# Patient Record
Sex: MALE | Race: WHITE | Employment: OTHER | URBAN - METROPOLITAN AREA
[De-identification: names, ages, dates, MRNs, and addresses within clinical notes are randomized per-mention and may not be internally consistent; named-entity substitution may affect disease eponyms.]

---

## 2019-09-11 ENCOUNTER — HOSPITAL ENCOUNTER (OUTPATIENT)
Dept: GENERAL RADIOLOGY | Age: 69
Discharge: HOME | End: 2019-09-11
Payer: MEDICARE

## 2019-09-11 DIAGNOSIS — R04.2 HEMOPTYSIS: ICD-10-CM

## 2019-09-11 PROCEDURE — 71046 X-RAY EXAM CHEST 2 VIEWS: CPT

## 2019-09-16 ENCOUNTER — HOSPITAL ENCOUNTER (OUTPATIENT)
Dept: NUCLEAR MEDICINE | Age: 69
Discharge: HOME | End: 2019-09-16
Attending: INTERNAL MEDICINE
Payer: MEDICARE

## 2019-09-16 DIAGNOSIS — R04.2 HEMOPTYSIS: ICD-10-CM

## 2019-09-16 PROCEDURE — 78582 LUNG VENTILAT&PERFUS IMAGING: CPT

## 2020-08-24 ENCOUNTER — HOSPITAL ENCOUNTER (OUTPATIENT)
Age: 70
Setting detail: OBSERVATION
Discharge: HOME | End: 2020-08-25
Attending: INTERNAL MEDICINE | Admitting: INTERNAL MEDICINE
Payer: MEDICARE

## 2020-08-24 ENCOUNTER — APPOINTMENT (OUTPATIENT)
Dept: CT IMAGING | Age: 70
End: 2020-08-24
Attending: INTERNAL MEDICINE
Payer: MEDICARE

## 2020-08-24 ENCOUNTER — HOSPITAL ENCOUNTER (OUTPATIENT)
Dept: CT IMAGING | Age: 70
Discharge: HOME | End: 2020-08-24
Attending: INTERNAL MEDICINE
Payer: MEDICARE

## 2020-08-24 DIAGNOSIS — N18.4 CHRONIC KIDNEY DISEASE, STAGE IV (SEVERE) (HCC): ICD-10-CM

## 2020-08-24 PROBLEM — R80.9 PROTEINURIA: Status: ACTIVE | Noted: 2020-08-24

## 2020-08-24 LAB
ABO + RH BLD: NORMAL
BLOOD GROUP ANTIBODIES SERPL: NORMAL
ERYTHROCYTE [DISTWIDTH] IN BLOOD BY AUTOMATED COUNT: 12 % (ref 11.5–14.5)
GLUCOSE BLD STRIP.AUTO-MCNC: 86 MG/DL (ref 65–100)
HCT VFR BLD AUTO: 42.2 % (ref 36.6–50.3)
HCT VFR BLD AUTO: 42.2 % (ref 36.6–50.3)
HGB BLD-MCNC: 13.5 G/DL (ref 12.1–17)
HGB BLD-MCNC: 13.6 G/DL (ref 12.1–17)
INR PPP: 1.1 (ref 0.9–1.1)
MCH RBC QN AUTO: 30.4 PG (ref 26–34)
MCHC RBC AUTO-ENTMCNC: 32.2 G/DL (ref 30–36.5)
MCV RBC AUTO: 94.4 FL (ref 80–99)
NRBC # BLD: 0 K/UL (ref 0–0.01)
NRBC BLD-RTO: 0 PER 100 WBC
PLATELET # BLD AUTO: 216 K/UL (ref 150–400)
PMV BLD AUTO: 10.3 FL (ref 8.9–12.9)
PROTHROMBIN TIME: 11.4 SEC (ref 9–11.1)
RBC # BLD AUTO: 4.47 M/UL (ref 4.1–5.7)
SERVICE CMNT-IMP: NORMAL
SPECIMEN EXP DATE BLD: NORMAL
WBC # BLD AUTO: 7.6 K/UL (ref 4.1–11.1)

## 2020-08-24 PROCEDURE — 50200 RENAL BIOPSY PERQ: CPT

## 2020-08-24 PROCEDURE — 86900 BLOOD TYPING SEROLOGIC ABO: CPT

## 2020-08-24 PROCEDURE — 36415 COLL VENOUS BLD VENIPUNCTURE: CPT

## 2020-08-24 PROCEDURE — 85610 PROTHROMBIN TIME: CPT

## 2020-08-24 PROCEDURE — 77030014115

## 2020-08-24 PROCEDURE — 99218 HC RM OBSERVATION: CPT

## 2020-08-24 PROCEDURE — 77030003503 HC NDL BIOP TISS BD -B

## 2020-08-24 PROCEDURE — 77030014007 HC SPNG HEMSTAT J&J -B

## 2020-08-24 PROCEDURE — 85018 HEMOGLOBIN: CPT

## 2020-08-24 PROCEDURE — 82962 GLUCOSE BLOOD TEST: CPT

## 2020-08-24 PROCEDURE — 74011250637 HC RX REV CODE- 250/637: Performed by: INTERNAL MEDICINE

## 2020-08-24 PROCEDURE — 74011000250 HC RX REV CODE- 250: Performed by: RADIOLOGY

## 2020-08-24 PROCEDURE — 85027 COMPLETE CBC AUTOMATED: CPT

## 2020-08-24 PROCEDURE — 77030003666 HC NDL SPINAL BD -A

## 2020-08-24 PROCEDURE — 74011250636 HC RX REV CODE- 250/636: Performed by: RADIOLOGY

## 2020-08-24 RX ORDER — MIDAZOLAM HYDROCHLORIDE 1 MG/ML
5 INJECTION, SOLUTION INTRAMUSCULAR; INTRAVENOUS
Status: DISCONTINUED | OUTPATIENT
Start: 2020-08-24 | End: 2020-08-24 | Stop reason: HOSPADM

## 2020-08-24 RX ORDER — DEXTROSE MONOHYDRATE 100 MG/ML
0-250 INJECTION, SOLUTION INTRAVENOUS AS NEEDED
Status: DISCONTINUED | OUTPATIENT
Start: 2020-08-24 | End: 2020-08-25 | Stop reason: HOSPADM

## 2020-08-24 RX ORDER — CLONIDINE HYDROCHLORIDE 0.1 MG/1
0.1 TABLET ORAL AS NEEDED
Status: DISCONTINUED | OUTPATIENT
Start: 2020-08-24 | End: 2020-08-24 | Stop reason: SDUPTHER

## 2020-08-24 RX ORDER — GLIPIZIDE 5 MG/1
5 TABLET ORAL
Status: DISCONTINUED | OUTPATIENT
Start: 2020-08-25 | End: 2020-08-25 | Stop reason: HOSPADM

## 2020-08-24 RX ORDER — SODIUM BICARBONATE 42 MG/ML
1 INJECTION, SOLUTION INTRAVENOUS
Status: COMPLETED | OUTPATIENT
Start: 2020-08-24 | End: 2020-08-24

## 2020-08-24 RX ORDER — PROPRANOLOL HYDROCHLORIDE 10 MG/1
10 TABLET ORAL
Status: DISCONTINUED | OUTPATIENT
Start: 2020-08-24 | End: 2020-08-24

## 2020-08-24 RX ORDER — MAGNESIUM SULFATE 100 %
4 CRYSTALS MISCELLANEOUS AS NEEDED
Status: DISCONTINUED | OUTPATIENT
Start: 2020-08-24 | End: 2020-08-25 | Stop reason: HOSPADM

## 2020-08-24 RX ORDER — AMLODIPINE BESYLATE 5 MG/1
5 TABLET ORAL DAILY
Status: DISCONTINUED | OUTPATIENT
Start: 2020-08-25 | End: 2020-08-25 | Stop reason: HOSPADM

## 2020-08-24 RX ORDER — SODIUM CHLORIDE 9 MG/ML
25 INJECTION, SOLUTION INTRAVENOUS CONTINUOUS
Status: DISCONTINUED | OUTPATIENT
Start: 2020-08-24 | End: 2020-08-24 | Stop reason: HOSPADM

## 2020-08-24 RX ORDER — NALOXONE HYDROCHLORIDE 0.4 MG/ML
0.4 INJECTION, SOLUTION INTRAMUSCULAR; INTRAVENOUS; SUBCUTANEOUS AS NEEDED
Status: DISCONTINUED | OUTPATIENT
Start: 2020-08-24 | End: 2020-08-24 | Stop reason: HOSPADM

## 2020-08-24 RX ORDER — FENTANYL CITRATE 50 UG/ML
200 INJECTION, SOLUTION INTRAMUSCULAR; INTRAVENOUS
Status: DISCONTINUED | OUTPATIENT
Start: 2020-08-24 | End: 2020-08-24 | Stop reason: HOSPADM

## 2020-08-24 RX ORDER — SODIUM CHLORIDE 0.9 % (FLUSH) 0.9 %
5-40 SYRINGE (ML) INJECTION AS NEEDED
Status: DISCONTINUED | OUTPATIENT
Start: 2020-08-24 | End: 2020-08-25 | Stop reason: HOSPADM

## 2020-08-24 RX ORDER — ACETAMINOPHEN 325 MG/1
650 TABLET ORAL
Status: DISCONTINUED | OUTPATIENT
Start: 2020-08-24 | End: 2020-08-25 | Stop reason: HOSPADM

## 2020-08-24 RX ORDER — ROSUVASTATIN CALCIUM 10 MG/1
5 TABLET, COATED ORAL
Status: DISCONTINUED | OUTPATIENT
Start: 2020-08-24 | End: 2020-08-25 | Stop reason: HOSPADM

## 2020-08-24 RX ORDER — SODIUM CHLORIDE 9 MG/ML
25 INJECTION, SOLUTION INTRAVENOUS CONTINUOUS
Status: DISCONTINUED | OUTPATIENT
Start: 2020-08-24 | End: 2020-08-28 | Stop reason: HOSPADM

## 2020-08-24 RX ORDER — SODIUM CHLORIDE 0.9 % (FLUSH) 0.9 %
5-40 SYRINGE (ML) INJECTION EVERY 8 HOURS
Status: DISCONTINUED | OUTPATIENT
Start: 2020-08-24 | End: 2020-08-28 | Stop reason: HOSPADM

## 2020-08-24 RX ORDER — LOSARTAN POTASSIUM 50 MG/1
50 TABLET ORAL DAILY
Status: DISCONTINUED | OUTPATIENT
Start: 2020-08-25 | End: 2020-08-25 | Stop reason: HOSPADM

## 2020-08-24 RX ORDER — SODIUM CHLORIDE 9 MG/ML
25 INJECTION, SOLUTION INTRAVENOUS AS NEEDED
Status: DISCONTINUED | OUTPATIENT
Start: 2020-08-24 | End: 2020-08-25 | Stop reason: HOSPADM

## 2020-08-24 RX ORDER — CLONIDINE HYDROCHLORIDE 0.1 MG/1
0.1 TABLET ORAL AS NEEDED
Status: DISCONTINUED | OUTPATIENT
Start: 2020-08-24 | End: 2020-08-25 | Stop reason: HOSPADM

## 2020-08-24 RX ORDER — SODIUM CHLORIDE 0.9 % (FLUSH) 0.9 %
5-40 SYRINGE (ML) INJECTION EVERY 8 HOURS
Status: DISCONTINUED | OUTPATIENT
Start: 2020-08-24 | End: 2020-08-25 | Stop reason: HOSPADM

## 2020-08-24 RX ORDER — FLUMAZENIL 0.1 MG/ML
0.5 INJECTION INTRAVENOUS ONCE
Status: DISCONTINUED | OUTPATIENT
Start: 2020-08-24 | End: 2020-08-24 | Stop reason: HOSPADM

## 2020-08-24 RX ORDER — SODIUM CHLORIDE 0.9 % (FLUSH) 0.9 %
5-40 SYRINGE (ML) INJECTION AS NEEDED
Status: DISCONTINUED | OUTPATIENT
Start: 2020-08-24 | End: 2020-08-28 | Stop reason: HOSPADM

## 2020-08-24 RX ORDER — INSULIN LISPRO 100 [IU]/ML
INJECTION, SOLUTION INTRAVENOUS; SUBCUTANEOUS
Status: DISCONTINUED | OUTPATIENT
Start: 2020-08-24 | End: 2020-08-25 | Stop reason: HOSPADM

## 2020-08-24 RX ADMIN — FENTANYL CITRATE 25 MCG: 50 INJECTION INTRAMUSCULAR; INTRAVENOUS at 15:26

## 2020-08-24 RX ADMIN — SODIUM CHLORIDE 25 ML/HR: 900 INJECTION, SOLUTION INTRAVENOUS at 14:30

## 2020-08-24 RX ADMIN — ROSUVASTATIN 5 MG: 10 TABLET, FILM COATED ORAL at 21:31

## 2020-08-24 RX ADMIN — FENTANYL CITRATE 50 MCG: 50 INJECTION INTRAMUSCULAR; INTRAVENOUS at 15:13

## 2020-08-24 RX ADMIN — MIDAZOLAM HYDROCHLORIDE 1 MG: 1 INJECTION, SOLUTION INTRAMUSCULAR; INTRAVENOUS at 14:49

## 2020-08-24 RX ADMIN — Medication 10 ML: at 21:31

## 2020-08-24 RX ADMIN — MIDAZOLAM HYDROCHLORIDE 0.5 MG: 1 INJECTION, SOLUTION INTRAMUSCULAR; INTRAVENOUS at 15:35

## 2020-08-24 RX ADMIN — SODIUM BICARBONATE 1 ML: 42 INJECTION, SOLUTION INTRAVENOUS at 15:28

## 2020-08-24 RX ADMIN — MIDAZOLAM HYDROCHLORIDE 0.5 MG: 1 INJECTION, SOLUTION INTRAMUSCULAR; INTRAVENOUS at 15:38

## 2020-08-24 RX ADMIN — MIDAZOLAM HYDROCHLORIDE 1 MG: 1 INJECTION, SOLUTION INTRAMUSCULAR; INTRAVENOUS at 15:13

## 2020-08-24 NOTE — H&P
Interventional Radiology History and Physical (Inpatient)    Patient: Damaso Quiroga 81 y.o. male     Referring Physician:  Laura Grey MD    Chief Complaint: No chief complaint on file. History of Present Illness: conscious sedation (Versed and fentanyl) for reanl biopsy    History:  No past medical history on file. No family history on file.   Social History     Socioeconomic History    Marital status:      Spouse name: Not on file    Number of children: Not on file    Years of education: Not on file    Highest education level: Not on file   Occupational History    Not on file   Social Needs    Financial resource strain: Not on file    Food insecurity     Worry: Not on file     Inability: Not on file    Transportation needs     Medical: Not on file     Non-medical: Not on file   Tobacco Use    Smoking status: Not on file   Substance and Sexual Activity    Alcohol use: Not on file    Drug use: Not on file    Sexual activity: Not on file   Lifestyle    Physical activity     Days per week: Not on file     Minutes per session: Not on file    Stress: Not on file   Relationships    Social connections     Talks on phone: Not on file     Gets together: Not on file     Attends Hinduism service: Not on file     Active member of club or organization: Not on file     Attends meetings of clubs or organizations: Not on file     Relationship status: Not on file    Intimate partner violence     Fear of current or ex partner: Not on file     Emotionally abused: Not on file     Physically abused: Not on file     Forced sexual activity: Not on file   Other Topics Concern    Not on file   Social History Narrative    Not on file       Allergies: No Known Allergies    Current Medications:  Current Facility-Administered Medications   Medication Dose Route Frequency    sodium chloride (NS) flush 5-40 mL  5-40 mL IntraVENous Q8H    sodium chloride (NS) flush 5-40 mL  5-40 mL IntraVENous PRN    0.9% sodium chloride infusion  25 mL/hr IntraVENous CONTINUOUS    sodium bicarbonate (4.2%) injection 42 mg  1 mL SubCUTAneous RAD ONCE     No current outpatient medications on file. Facility-Administered Medications Ordered in Other Encounters   Medication Dose Route Frequency    sodium chloride (NS) flush 5-40 mL  5-40 mL IntraVENous Q8H    sodium chloride (NS) flush 5-40 mL  5-40 mL IntraVENous PRN    0.9% sodium chloride infusion 25 mL  25 mL IntraVENous PRN    cloNIDine HCL (CATAPRES) tablet 0.1 mg  0.1 mg Oral PRN    fentaNYL citrate (PF) injection 200 mcg  200 mcg IntraVENous Multiple    midazolam (PF) (VERSED) injection 5 mg  5 mg IntraVENous Multiple    0.9% sodium chloride infusion  25 mL/hr IntraVENous CONTINUOUS    flumazeniL (ROMAZICON) 0.1 mg/mL injection 0.5 mg  0.5 mg IntraVENous ONCE    naloxone (NARCAN) injection 0.4 mg  0.4 mg IntraVENous PRN        Physical Exam:  There were no vitals taken for this visit. GENERAL: alert, cooperative, no distress, appears stated age, LUNG: clear to auscultation bilaterally, HEART: regular rate and rhythm    Findings/Diagnosis: conscious sedation (Versed and fentanyl) for renal biopsy    Alerts:      Laboratory:      Recent Labs     08/24/20  1124   HGB 13.6   HCT 42.2   WBC 7.6      INR 1.1         Plan of Care/Planned Procedure:  Risks, benefits, and alternatives reviewed with patient and he agrees to proceed with the procedure. Full dictated report to follow.

## 2020-08-24 NOTE — PROGRESS NOTES
Renal-I put in orders for his biopsy at 0830 this morning. I'm not sure why they have been re-entered for 1115. Pre and post biopsy orders were entered at the same time this morning. I'm not sure where the confusion is. Dr. Arron Carlson will see him on rounds later today. He is being biopsied for proteinuria/ckd with hx of MGUS (IgG) s/p bone marrow.      Susie Meeks MD

## 2020-08-24 NOTE — PROGRESS NOTES
Lft message at [facility] office for Violet Hansen to call back. Have no order for patient. 1543 Spoke to Violet Hansen, she advised that orders were sent over for the patient on Friday, transferred her to [facility]. [facility] Spoke to Violet Hansen, she advised that Angio could see orders, to which there are orders now in the [facility], but still no note from the MD and no nursing orders in the active or signed & held. Violet Hansen advised to perfect serve MD Turner. [address] [facility] a perfect serve message:  Was Md Tere Macdonald going to put in any notes on the patient. There is nothing in the note section. Also, I only see orders for the renal biopsy, as a nursing role, are we to have any orders? There is nothing under the managed order section active or signed & held. Thanks. Imani Deirdre think you need anything other than what you have for now. She should be npo. Ill see the patient later today and leave a note.  Ill put in post biopsy orders

## 2020-08-24 NOTE — PROGRESS NOTES
TRANSFER - OUT REPORT:    Verbal report given to Shilpa Robin, unit RN on Amber Rings  being transferred to  for routine progression of care       Report consisted of patients Situation, Background, Assessment and   Recommendations(SBAR). Information from the following report(s) SBAR, Procedure Summary and MAR was reviewed with the receiving nurse. Lines:   Peripheral IV 08/24/20 Left;Posterior Forearm (Active)        Opportunity for questions and clarification was provided.       Patient transported with:   transport on stretcher back to unit ; right flank dsg and bandaid dry and intact

## 2020-08-24 NOTE — PROGRESS NOTES
Problem: Falls - Risk of  Goal: *Absence of Falls  Description: Document Dorothea Campos Fall Risk and appropriate interventions in the flowsheet.   Outcome: Progressing Towards Goal  Note: Fall Risk Interventions:                                Problem: Patient Education: Go to Patient Education Activity  Goal: Patient/Family Education  Outcome: Progressing Towards Goal     Problem: Discharge Planning  Goal: *Discharge to safe environment  Outcome: Progressing Towards Goal     Problem: Discharge Planning  Goal: *Discharge to safe environment  Outcome: Progressing Towards Goal     Problem: Discharge Planning  Goal: *Discharge to safe environment  Outcome: Progressing Towards Goal

## 2020-08-24 NOTE — PROGRESS NOTES
Messaged MD Turner, Patient's heart rate is anywhere between 49 and 53. /79. I see during the procedure he was as low as 48. Please advise    [phone] If hes not symptomatic, Id continue to monitor. If symptomatic let me know.  I will stop his beta-blocker

## 2020-08-24 NOTE — H&P
H+P Note    NAME: Bruce Sebastian   :  1950   MRN:  [id number]     Date/Time:  2020 5:03 PM         Assessment :    Plan:  Proteinuria  CKD-4  DM  HTN  Gout  MGUS Renal biopsy today. Post-biopsy orders entered. Home in AM if stable. ADDENDUM:    17:46 - RN reports that the patient's HR has been 49-53. This is the same as it was during the biopsy. Apparently asymptomatic. I will hold propranolol. Continue to monitor vitals. I asked to get called if he is symptomatic. Subjective:   CHIEF COMPLAINT:  \"I'm here for a biopsy. \"    HISTORY OF PRESENT ILLNESS:     Cornelio Ramirez is a 81 y.o.   male who has a history of CKD-4 followed by Dr. Emil Ganser in obs for renal biopsy. Review of chart shows that his creatinine has been ranging from 1.7-1.9. As well he has had proteinuria. He also sees Dr. Derek Hill for an MGUS. Plan is for biopsy to be sure that there is no myeloma in the kidney. He says that he feels fine. No dysuria. No past medical history on file. No past surgical history on file. Social History     Tobacco Use    Smoking status: Not on file   Substance Use Topics    Alcohol use: Not on file      No family history on file.    No Known Allergies   Prior to Admission medications    Not on File     REVIEW OF SYSTEMS:     []  Unable to obtain reliable ROS due to  [] mental status  [] sedated   [] intubated   [x] Total of 12 systems reviewed as follows:  Constitutional: negative fever, negative chills, negative weight loss  Eyes:   negative visual changes  ENT:   negative sore throat, tongue or lip swelling  Respiratory:  negative cough, negative dyspnea  Cards:  negative for chest pain, palpitations, lower extremity edema  GI:   negative for nausea, vomiting, diarrhea, and abdominal pain  :  negative for frequency, dysuria  Integument:  negative for rash and pruritus  Heme:  negative for easy bruising and gum/nose bleeding  Musculoskel: negative for myalgias,  back pain and muscle weakness  Neuro:  negative for headaches, dizziness, vertigo  Psych:  negative for feelings of anxiety, depression   Travel?: none    Objective:   VITALS:    Visit Vitals  /80 (BP 1 Location: Right arm, BP Patient Position: At rest;Supine)   Pulse (!) 49   Temp 98.4 °F (36.9 °C)   Resp 15   Ht 5' 11\" (1.803 m)   Wt 93.9 kg (207 lb)   SpO2 98%   BMI 28.87 kg/m²     PHYSICAL EXAM:  Gen:  [x]  WD [x]  WN  [] cachectic []  thin []  obese []  disheveled             []  ill apearing  []   Critical  []   Chronic    []  No acute distress    HEENT:   [] NC/AT/PERRLA/EOMI    [] pink conjunctivae      [] pale conjunctivae                  PERRL  [] yes  [] no      [] moist mucosa    [] dry mucosa    hearing intact to voice [] yes  [] No                 NECK:   supple [] yes  [] no        masses [] yes  [] No               thyroid  []  non tender  []  tender    RESP:   [x] CTA bilaterally/no wheezing/rhonchi/rales/crackles    [] rhonchi bilaterally - no dullness  [] wheezing   [] rhonchi   [] crackles     use of accessory muscles [] yes [] no    CARD:   [x]  regular rate and rhythm/No murmurs/rubs/gallops    murmur  [] yes ()  [] no      Rubs  [] yes  [] no       Gallops [] yes  [] no    Rate []  regular  []  irregular        carotid bruits  [] Right  []  Left                 LE edema [] yes  [x] no           JVP  []  yes   []  no    ABD:    [x] soft/non distended/non tender/+bowel sounds/no HSM    []  Rigid    tenderness [] yes [] no   Liver enlargement  []  yes []  no                Spleen enlargement  []  yes []  no     distended []  yes [] no     bowel sound  [] hypoactive   [] hyperactive    LYMPH:    Neck []  yes []  no       Axillae []  yes []  no    SKIN:   Rashes []  yes   []  no    Ulcers []  yes   []  no               [] tight to palpitation    skin turgor []  good  [] poor  [] decreased               Cyanosis/clubbing []  yes []  no    NEUR:   [] cranial nerves II-XII grossly intact       [] Cranial nerves deficit                 []  facial droop    []  slurred speech   [] aphasic     [] Strength normal     []  weakness  []  LUE  []   RUE/ []  LLE  []   RLE    follows commands  []  yes []  no           PSYCH:   insight [] poor [] good   Alert and Oriented to  [] person  [] place  []  time                    [] depressed [] anxious [] agitated  [] lethargic [] stuporous  [] sedated     LAB DATA REVIEWED:    Recent Labs     08/24/20  1124   WBC 7.6   HGB 13.6   HCT 42.2        No results for input(s): NA, K, CL, CO2, BUN, CREA, GLU, CA, MG, PHOS, URICA in the last 72 hours. No results for input(s): ALT, AP, TBIL, TBILI, ALB, GLOB, GGT, AML, LPSE in the last 72 hours. No lab exists for component: SGOT, GPT, AMYP, HLPSE  Recent Labs     08/24/20  1124   INR 1.1   PTP 11.4*      No results for input(s): FE, TIBC, PSAT, FERR in the last 72 hours. No results for input(s): PH, PCO2, PO2 in the last 72 hours. No results for input(s): CPK, CKMB in the last 72 hours.     No lab exists for component: TROPONINI  No results found for: GLUCPOC    Procedures: see electronic medical records for all procedures/Xrays and details which were not copied into this note but were reviewed prior to creation of Plan.    ________________________________________________________________________       ___________________________________________________  Consulting Physician: Latasha Hill MD

## 2020-08-24 NOTE — PROGRESS NOTES
LEONID  RUR-NA    Reason for Admission:   Kidney Biopsy                   RUR Score:        NA             Plan for utilizing home health:      No needs    PCP: First and Last name:     Name of Practice: [address] [place] [address],[address]   Are you a current patient: Yes/No: Yes   Approximate date of last visit: Seen within the last 6 months. Can you participate in a virtual visit with your PCP: Yes                    Current Advanced Directive/Advance Care Plan: Not on file                         Transition of Care Plan:       CM met with patient to inform of CM role and to assess needs. Patient is independent with self-care and ADLs. Patient does not use any DME. Patient will transition home following biopsy that is scheduled today. Preferred pharmacy is [facility]. Patient demographic information verified. There are no CM needs at this time. Observation notice provided in writing to patient and/or caregiver as well as verbal explanation of the policy. Patients who are in outpatient status also receive the Observation notice.       Leon Schneider MS

## 2020-08-25 VITALS
BODY MASS INDEX: 28.98 KG/M2 | OXYGEN SATURATION: 94 % | HEART RATE: 58 BPM | WEIGHT: 207 LBS | DIASTOLIC BLOOD PRESSURE: 77 MMHG | TEMPERATURE: 98.7 F | SYSTOLIC BLOOD PRESSURE: 132 MMHG | HEIGHT: 71 IN | RESPIRATION RATE: 17 BRPM

## 2020-08-25 LAB
ERYTHROCYTE [DISTWIDTH] IN BLOOD BY AUTOMATED COUNT: 11.9 % (ref 11.5–14.5)
GLUCOSE BLD STRIP.AUTO-MCNC: 105 MG/DL (ref 65–100)
GLUCOSE BLD STRIP.AUTO-MCNC: 124 MG/DL (ref 65–100)
HCT VFR BLD AUTO: 40.7 % (ref 36.6–50.3)
HGB BLD-MCNC: 13.3 G/DL (ref 12.1–17)
MCH RBC QN AUTO: 30.4 PG (ref 26–34)
MCHC RBC AUTO-ENTMCNC: 32.7 G/DL (ref 30–36.5)
MCV RBC AUTO: 92.9 FL (ref 80–99)
NRBC # BLD: 0 K/UL (ref 0–0.01)
NRBC BLD-RTO: 0 PER 100 WBC
PLATELET # BLD AUTO: 182 K/UL (ref 150–400)
PMV BLD AUTO: 10 FL (ref 8.9–12.9)
RBC # BLD AUTO: 4.38 M/UL (ref 4.1–5.7)
SERVICE CMNT-IMP: ABNORMAL
SERVICE CMNT-IMP: ABNORMAL
WBC # BLD AUTO: 10.3 K/UL (ref 4.1–11.1)

## 2020-08-25 PROCEDURE — 85027 COMPLETE CBC AUTOMATED: CPT

## 2020-08-25 PROCEDURE — 82962 GLUCOSE BLOOD TEST: CPT

## 2020-08-25 PROCEDURE — 36415 COLL VENOUS BLD VENIPUNCTURE: CPT

## 2020-08-25 PROCEDURE — 99218 HC RM OBSERVATION: CPT

## 2020-08-25 PROCEDURE — 74011250637 HC RX REV CODE- 250/637: Performed by: INTERNAL MEDICINE

## 2020-08-25 RX ORDER — ROSUVASTATIN CALCIUM 5 MG/1
5 TABLET, COATED ORAL
Qty: 30 TAB | Refills: 0 | Status: SHIPPED
Start: 2020-08-25 | End: 2022-06-06

## 2020-08-25 RX ORDER — GLIPIZIDE 5 MG/1
5 TABLET ORAL
Qty: 30 TAB | Refills: 0 | Status: SHIPPED
Start: 2020-08-26 | End: 2022-06-06

## 2020-08-25 RX ORDER — LOSARTAN POTASSIUM 50 MG/1
50 TABLET ORAL DAILY
Qty: 30 TAB | Refills: 0 | Status: SHIPPED
Start: 2020-08-26

## 2020-08-25 RX ORDER — AMLODIPINE BESYLATE 5 MG/1
5 TABLET ORAL DAILY
Qty: 30 TAB | Refills: 0 | Status: SHIPPED
Start: 2020-08-26

## 2020-08-25 RX ADMIN — GLIPIZIDE 5 MG: 5 TABLET ORAL at 09:38

## 2020-08-25 RX ADMIN — LOSARTAN POTASSIUM 50 MG: 50 TABLET, FILM COATED ORAL at 08:36

## 2020-08-25 RX ADMIN — AMLODIPINE BESYLATE 5 MG: 5 TABLET ORAL at 08:36

## 2020-08-25 NOTE — PROGRESS NOTES
NAME: Boogie Garcia        :  1950        MRN:  [id number]                   Assessment   :                                               Plan:  Proteinuria  CKD-4  DM  HTN  Gout  MGUS S/P renal biopsy. Tolerated well. SBP stable. H/H stable. OK for DC.                            Subjective:     Chief Complaint:  \" I'm going to stay until after lunch. \"  No flank pain. No hematuria. No N/V. No dyspnea. Review of Systems:    Symptom Y/N Comments  Symptom Y/N Comments   Fever/Chills    Chest Pain     Poor Appetite    Edema     Cough    Abdominal Pain     Sputum    Joint Pain     SOB/HONG    Pruritis/Rash     Nausea/vomit    Tolerating PT/OT     Diarrhea    Tolerating Diet     Constipation    Other       Could not obtain due to:      Objective:     VITALS:   Last 24hrs VS reviewed since prior progress note. Most recent are:  Visit Vitals  /77   Pulse (!) 58   Temp 98.7 °F (37.1 °C)   Resp 17   Ht 5' 11\" (1.803 m)   Wt 93.9 kg (207 lb)   SpO2 94%   BMI 28.87 kg/m²     No intake or output data in the 24 hours ending 20 1239   Telemetry Reviewed:     PHYSICAL EXAM:  General: NAD  No edema      Lab Data Reviewed: (see below)    Medications Reviewed: (see below)    PMH/SH reviewed - no change compared to H&P  ________________________________________________________________________  Care Plan discussed with:  Patient y    Family      RN y    Care Manager                    Consultant:          Comments   >50% of visit spent in counseling and coordination of care       ________________________________________________________________________  [facility] MD Justyn     Procedures: see electronic medical records for all procedures/Xrays and details which  were not copied into this note but were reviewed prior to creation of Plan.       LABS:  Recent Labs     20  0119 20  1124   WBC 10.3 --  7.6   HGB 13.3 13.5 13.6   HCT 40.7 42.2 42.2     --  216     No results for input(s): NA, K, CL, CO2, BUN, CREA, GLU, CA, MG, PHOS, URICA in the last 72 hours. No results for input(s): AP, TBIL, TP, ALB, GLOB, GGT, AML, LPSE in the last 72 hours. No lab exists for component: SGOT, GPT, AMYP, HLPSE  Recent Labs     08/24/20  1124   INR 1.1   PTP 11.4*      No results for input(s): FE, TIBC, PSAT, FERR in the last 72 hours. No results found for: FOL, RBCF   No results for input(s): PH, PCO2, PO2 in the last 72 hours. No results for input(s): CPK, CKMB in the last 72 hours.     No lab exists for component: TROPONINI  No components found for: GLPOC  No results found for: COLOR, APPRN, SPGRU, REFSG, SUNG, PROTU, GLUCU, KETU, BILU, UROU, VIJAY, LEUKU, GLUKE, EPSU, BACTU, WBCU, RBCU, CASTS, UCRY    MEDICATIONS:  Current Facility-Administered Medications   Medication Dose Route Frequency    sodium chloride (NS) flush 5-40 mL  5-40 mL IntraVENous Q8H    sodium chloride (NS) flush 5-40 mL  5-40 mL IntraVENous PRN    0.9% sodium chloride infusion 25 mL  25 mL IntraVENous PRN    cloNIDine HCL (CATAPRES) tablet 0.1 mg  0.1 mg Oral PRN    amLODIPine (NORVASC) tablet 5 mg  5 mg Oral DAILY    losartan (COZAAR) tablet 50 mg  50 mg Oral DAILY    glipiZIDE (GLUCOTROL) tablet 5 mg  5 mg Oral ACB    rosuvastatin (CRESTOR) tablet 5 mg  5 mg Oral QHS    insulin lispro (HUMALOG) injection   SubCUTAneous TIDAC    glucose chewable tablet 16 g  4 Tab Oral PRN    glucagon (GLUCAGEN) injection 1 mg  1 mg IntraMUSCular PRN    dextrose 10% infusion 0-250 mL  0-250 mL IntraVENous PRN    acetaminophen (TYLENOL) tablet 650 mg  650 mg Oral Q4H PRN     Facility-Administered Medications Ordered in Other Encounters   Medication Dose Route Frequency    sodium chloride (NS) flush 5-40 mL  5-40 mL IntraVENous Q8H    sodium chloride (NS) flush 5-40 mL  5-40 mL IntraVENous PRN    0.9% sodium chloride infusion  25 mL/hr IntraVENous CONTINUOUS

## 2020-08-25 NOTE — PROGRESS NOTES
Discharge instructions reviewed with patient. IV removed. Verbalized understanding of instructions. Discharged to home via wheelchair.

## 2020-08-25 NOTE — DISCHARGE SUMMARY
Discharge Summary    NAME: Alayna Soares   :  1950   MRN:  [id number]     DISCHARGE DIAGNOSIS:  CKD    CONSULTATIONS:  None    Follow Up: Follow-up Information     Follow up With Specialties Details Why Contact Juaquin Paulino NP Family Medicine   [facility] [address] [facility]  [phone]            Procedures: see electronic medical records for all procedures/Xrays and details which were not copied into this note but were reviewed prior to creation of Plan. Please follow-up tests/labs that are still pendin. Renal biopsy    PMH/ reviewed - no change compared to H&P    DISCHARGE SUMMARY/HOSPITAL COURSE: for full details see H&P, daily progress notes, labs, consult notes. Briefly As Per HPI:   Pt had renal biopsy. Tolerated well. H/H stable. The patient's hospital course was complicated by:  nothing    _______________________________________________________________________   Patient seen and examined by me on day of discharge. Pertinent findings are:  Gen:nad  HEENT:ncat  Chest:cta  Cv:rrr  Abd:nd/nt  Neuro: A+OX3    See Discharge Instructions for further details. _______________________________________________________________________    Medications Reviewed:  Current Discharge Medication List      START taking these medications    Details   glipiZIDE (GLUCOTROL) 5 mg tablet Take 1 Tab by mouth Daily (before breakfast). Qty: 30 Tab, Refills: 0      amLODIPine (NORVASC) 5 mg tablet Take 1 Tab by mouth daily. Qty: 30 Tab, Refills: 0      losartan (COZAAR) 50 mg tablet Take 1 Tab by mouth daily. Qty: 30 Tab, Refills: 0      rosuvastatin (CRESTOR) 5 mg tablet Take 1 Tab by mouth nightly.   Qty: 30 Tab, Refills: 0         PLEASE RESUME HOME MEDS  _______________________________________________________________________    Risk of deterioration: Low  ________________________________________________________________________    Disposition  Home with family, no needs  ________________________________________________________________________    Care Plan discussed with:   Patient, RN  ________________________________________________________________________    Code Status: Full Code  ________________________________________________________________________    Total time spent in discharge (mi    ________________________________________________________________________    CDMP Checked: Yes    Signed: [place] Feil, MD    This note will not be viewable in [address]

## 2020-08-25 NOTE — DISCHARGE INSTRUCTIONS
Patient Education        Needle Biopsy of the Kidney: What to Expect at Home  Your Recovery     A kidney biopsy is a test to take a sample (biopsy) of kidney. The doctor puts a long needle through your back (flank) into the kidney. Another doctor will look at the kidney tissue with a microscope to check for problems. After the test, you will be told to lie down on your back for several hours. After this, you should avoid strenuous activity for the next 2 to 3 days. It's normal to feel some soreness in the area of the biopsy for 2 to 3 days. You may have a small amount of bleeding on the bandage after the test. You may notice some blood in your urine after the test. This should go away within 12 to 24 hours. If it doesn't, call your doctor. This care sheet gives you a general idea about how long it will take for you to recover. But each person recovers at a different pace. Follow the steps below to feel better as quickly as possible. How can you care for yourself at home? Activity  · Avoid lifting anything that would make you strain. This may include heavy grocery bags and milk containers, a heavy briefcase or backpack, cat litter or dog food bags, a vacuum , or a child. · Avoid strenuous activities, such as bicycle riding, jogging, weight lifting, or aerobic exercise, until your doctor says it is okay. · Try to walk each day. Start by walking a little more than you did the day before. Bit by bit, increase the amount you walk. Walking boosts blood flow and helps prevent pneumonia and constipation. · Rest when you feel tired. Getting enough sleep will help you recover. · Ask your doctor when it is okay for you to have sex. Diet  · You can eat your normal diet. If your stomach is upset, try bland, low-fat foods like plain rice, broiled chicken, toast, and yogurt. · Drink plenty of fluids to avoid becoming dehydrated. Medicines  · Your doctor will tell you if and when you can restart your medicines. He or she will also give you instructions about taking any new medicines. · If you take aspirin or some other blood thinner, ask your doctor if and when to start taking it again. Make sure that you understand exactly what your doctor wants you to do. · Do not take aspirin or anti-inflammatory medicines for a week after the biopsy. Incision care  · Keep a bandage over the puncture site for the first 1 or 2 days. Follow-up care is a key part of your treatment and safety. Be sure to make and go to all appointments, and call your doctor if you are having problems. It's also a good idea to know your test results and keep a list of the medicines you take. When should you call for help? [id number] anytime you think you may need emergency care. For example, call if:  · You passed out (lost consciousness). Call your doctor now or seek immediate medical care if:  · You have signs of infection, such as:  ? Increased pain, swelling, warmth, or redness. ? Red streaks leading from the puncture site. ? Pus draining from the puncture site. ? A fever. · Bright red blood has soaked through the bandage over the puncture site. · You have new or worse pain at the puncture site. Watch closely for any changes in your health, and call your doctor if:  · You have blood in your urine for more than 1 day. Where can you learn more? Go to http://www.gray.com/  Enter S675 in the search box to learn more about \"Needle Biopsy of the Kidney: What to Expect at Home. \"  Current as of: August 12, 2019               Content Version: 12.5  © 4892-8615 [employer]. Care instructions adapted under license by [employer] (which disclaims liability or warranty for this information). If you have questions about a medical condition or this instruction, always ask your healthcare professional. [address] any warranty or liability for your use of this information.

## 2021-02-02 ENCOUNTER — HOSPITAL ENCOUNTER (EMERGENCY)
Age: 71
Discharge: HOME | End: 2021-02-02
Attending: EMERGENCY MEDICINE
Payer: MEDICARE

## 2021-02-02 ENCOUNTER — APPOINTMENT (OUTPATIENT)
Dept: GENERAL RADIOLOGY | Age: 71
End: 2021-02-02
Attending: EMERGENCY MEDICINE
Payer: MEDICARE

## 2021-02-02 VITALS
BODY MASS INDEX: 31.08 KG/M2 | RESPIRATION RATE: 20 BRPM | OXYGEN SATURATION: 97 % | TEMPERATURE: 97.1 F | HEIGHT: 72 IN | HEART RATE: 70 BPM | DIASTOLIC BLOOD PRESSURE: 95 MMHG | SYSTOLIC BLOOD PRESSURE: 156 MMHG | WEIGHT: 229.5 LBS

## 2021-02-02 DIAGNOSIS — S82.831A CLOSED FRACTURE OF DISTAL END OF RIGHT FIBULA, UNSPECIFIED FRACTURE MORPHOLOGY, INITIAL ENCOUNTER: Primary | ICD-10-CM

## 2021-02-02 PROCEDURE — 99283 EMERGENCY DEPT VISIT LOW MDM: CPT

## 2021-02-02 PROCEDURE — 73610 X-RAY EXAM OF ANKLE: CPT

## 2021-02-02 RX ORDER — HYDROCODONE BITARTRATE AND ACETAMINOPHEN 5; 325 MG/1; MG/1
1 TABLET ORAL
Qty: 10 TAB | Refills: 0 | Status: SHIPPED | OUTPATIENT
Start: 2021-02-02 | End: 2021-02-05

## 2021-02-02 NOTE — ED TRIAGE NOTES
Triage: pt was walking down steps and slipped on ice injuring right ankle. +swelling and limited ROM. Denies LOC or hitting head.

## 2021-02-02 NOTE — ED NOTES
Pt wheelchaired out of ED with discharge instructions and prescriptions in hand given by Dr. Jose Whtie; pt verbalized understanding of discharge paperwork and time allotted for questions. VSS. Pt alert and oriented.

## 2021-02-02 NOTE — ED PROVIDER NOTES
Date of Service:  2/2/2021    Patient:  Jyoti Art    Chief Complaint:  Ankle Injury       HPI:  Jyoti Art is a 78 y.o.  male who presents for evaluation of right ankle pain. Patient had a mechanical trip and fall where he believes he inverted his ankle. He was able to get up and ambulate to his car and drive to this facility. He arrives here with complaints of ankle pain on the right. No knee pain. He otherwise denies head strike loss of consciousness or other acute complaints. Pain is worse with attempted ambulation and movement, 7 out of 10.  4 out of 10 at rest           Past Medical History:   Diagnosis Date    Chronic kidney disease     Stage 3-4    Diabetes ([facility] [address]     Gout     Hypertension        History reviewed. No pertinent surgical history. History reviewed. No pertinent family history.     Social History     Socioeconomic History    Marital status:      Spouse name: Not on file    Number of children: Not on file    Years of education: Not on file    Highest education level: Not on file   Occupational History    Not on file   Social Needs    Financial resource strain: Not on file    Food insecurity     Worry: Not on file     Inability: Not on file    Transportation needs     Medical: Not on file     Non-medical: Not on file   Tobacco Use    Smoking status: Former Smoker    Smokeless tobacco: Never Used   Substance and Sexual Activity    Alcohol use: Never     Frequency: Never    Drug use: Never    Sexual activity: Not on file   Lifestyle    Physical activity     Days per week: Not on file     Minutes per session: Not on file    Stress: Not on file   Relationships    Social connections     Talks on phone: Not on file     Gets together: Not on file     Attends Roman [employer] service: Not on file     Active member of club or organization: Not on file     Attends meetings of clubs or organizations: Not on file     Relationship status: Not on file    Intimate partner violence     Fear of current or ex partner: Not on file     Emotionally abused: Not on file     Physically abused: Not on file     Forced sexual activity: Not on file   Other Topics Concern    Not on file   Social History Narrative    Not on file         ALLERGIES: Patient has no known allergies. Review of Systems   Constitutional: Negative for fever. HENT: Negative for hearing loss. Eyes: Negative for visual disturbance. Respiratory: Negative for shortness of breath. Cardiovascular: Negative for chest pain. Gastrointestinal: Negative for abdominal pain. Genitourinary: Negative for flank pain. Musculoskeletal: Positive for gait problem and joint swelling. Negative for back pain, neck pain and neck stiffness. Skin: Negative for rash. Neurological: Negative for dizziness, weakness, light-headedness and numbness. Psychiatric/Behavioral: Negative for confusion and suicidal ideas. Vitals:    02/02/21 1541   BP: (!) 156/95   Pulse: 70   Resp: 20   SpO2: 97%   Weight: 104.1 kg (229 lb 8 oz)   Height: 6' (1.829 m)            Physical Exam  Vitals signs and nursing note reviewed. Constitutional:       General: He is not in acute distress. Appearance: He is well-developed. He is not ill-appearing or toxic-appearing. HENT:      Head: Normocephalic and atraumatic. Neck:      Vascular: No JVD. Trachea: No tracheal deviation. Cardiovascular:      Rate and Rhythm: Normal rate. Pulmonary:      Effort: Pulmonary effort is normal. No respiratory distress. Abdominal:      General: Abdomen is flat. There is no distension. Musculoskeletal:      Comments: Right ankle swelling. ROM testing limited. No foot pain. No knee pain   Skin:     General: Skin is warm and dry. Capillary Refill: Capillary refill takes less than 2 seconds. Findings: No rash. Neurological:      Mental Status: He is alert and oriented to person, place, and time.       Sensory: No sensory deficit. Psychiatric:         Mood and Affect: Mood normal.         Behavior: Behavior normal.          MDM     VITAL SIGNS:  Patient Vitals for the past 4 hrs:   Pulse Resp BP SpO2   02/02/21 1541 70 20 (!) 156/95 97 %         LABS:  No results found for this or any previous visit (from the past 6 hour(s)). IMAGING:  XR ANKLE RT MIN 3 V   Final Result   Relatively nondisplaced obliquely oriented fracture through the   distal fibula. Medications During Visit:  Medications - No data to display      DECISION MAKING:  Kami Tran is a 78 y.o. male who comes in as above. Imaging as above. Patient has a nondisplaced fibular fracture. Patient will be placed in a long boot, medicines as below, crutches, orthopedic follow-up. Plan is discussed with orthopedics. Patient agrees to plan. IMPRESSION:  1. Closed fracture of distal end of right fibula, unspecified fracture morphology, initial encounter        DISPOSITION:  Discharged      Current Discharge Medication List      START taking these medications    Details   HYDROcodone-acetaminophen (Norco) 5-325 mg per tablet Take 1 Tab by mouth every four (4) hours as needed for Pain for up to 3 days. Max Daily Amount: 6 Tabs. Qty: 10 Tab, Refills: 0    Associated Diagnoses: Closed fracture of distal end of right fibula, unspecified fracture morphology, initial encounter              Follow-up Information     Follow up With Specialties Details Why Contact Info    Miguel Woodson NP Family Medicine   [facility] [address]  [address] [id number]  [address] [place] [address], [address] [place] [address] Orthopedic Surgery Schedule an appointment as soon as possible for a visit  ext [address] [place] [address] [phone]               The patient is asked to follow-up with their primary care provider in the next several days. They are to call tomorrow for an appointment.   The patient is asked to return promptly for any increased concerns or worsening of symptoms. They can return to this emergency department or any other emergency department.     Procedures

## 2021-03-30 ENCOUNTER — HOSPITAL ENCOUNTER (OUTPATIENT)
Dept: VASCULAR SURGERY | Age: 71
Discharge: HOME | End: 2021-03-30
Attending: ORTHOPAEDIC SURGERY
Payer: MEDICARE

## 2021-03-30 ENCOUNTER — TRANSCRIBE ORDER (OUTPATIENT)
Dept: SCHEDULING | Age: 71
End: 2021-03-30

## 2021-03-30 DIAGNOSIS — R60.9 EDEMA: Primary | ICD-10-CM

## 2021-03-30 DIAGNOSIS — R60.9 EDEMA: ICD-10-CM

## 2021-03-30 PROCEDURE — 93971 EXTREMITY STUDY: CPT

## 2021-08-06 ENCOUNTER — TRANSCRIBE ORDER (OUTPATIENT)
Dept: SCHEDULING | Age: 71
End: 2021-08-06

## 2021-08-06 DIAGNOSIS — I82.409 ACUTE DEEP VEIN THROMBOSIS (DVT) (HCC): Primary | ICD-10-CM

## 2021-08-17 ENCOUNTER — HOSPITAL ENCOUNTER (OUTPATIENT)
Dept: ULTRASOUND IMAGING | Age: 71
Discharge: HOME | End: 2021-08-17
Attending: INTERNAL MEDICINE
Payer: MEDICARE

## 2021-08-17 DIAGNOSIS — I82.409 ACUTE DEEP VEIN THROMBOSIS (DVT) (HCC): ICD-10-CM

## 2021-08-17 PROCEDURE — 93971 EXTREMITY STUDY: CPT

## 2021-09-03 ENCOUNTER — TRANSCRIBE ORDER (OUTPATIENT)
Dept: SCHEDULING | Age: 71
End: 2021-09-03

## 2021-09-03 DIAGNOSIS — R13.10 DYSPHAGIA: ICD-10-CM

## 2021-09-03 DIAGNOSIS — R04.0 EPISTAXIS: Primary | ICD-10-CM

## 2021-09-28 ENCOUNTER — TRANSCRIBE ORDER (OUTPATIENT)
Dept: SCHEDULING | Age: 71
End: 2021-09-28

## 2021-09-28 ENCOUNTER — HOSPITAL ENCOUNTER (OUTPATIENT)
Dept: VASCULAR SURGERY | Age: 71
Discharge: HOME | End: 2021-09-28
Attending: FAMILY MEDICINE
Payer: MEDICARE

## 2021-09-28 DIAGNOSIS — I82.409 DEEP VEIN THROMBOSIS (HCC): ICD-10-CM

## 2021-09-28 DIAGNOSIS — I82.409 DEEP VEIN THROMBOSIS (HCC): Primary | ICD-10-CM

## 2021-09-28 PROCEDURE — 93971 EXTREMITY STUDY: CPT

## 2022-03-18 PROBLEM — R80.9 PROTEINURIA: Status: ACTIVE | Noted: 2020-08-24

## 2022-06-06 ENCOUNTER — APPOINTMENT (OUTPATIENT)
Dept: GENERAL RADIOLOGY | Age: 72
End: 2022-06-06
Attending: EMERGENCY MEDICINE
Payer: MEDICARE

## 2022-06-06 ENCOUNTER — HOSPITAL ENCOUNTER (EMERGENCY)
Age: 72
Discharge: HOME | End: 2022-06-06
Attending: EMERGENCY MEDICINE
Payer: MEDICARE

## 2022-06-06 VITALS
HEART RATE: 58 BPM | OXYGEN SATURATION: 99 % | HEIGHT: 71 IN | SYSTOLIC BLOOD PRESSURE: 121 MMHG | BODY MASS INDEX: 31.39 KG/M2 | WEIGHT: 224.21 LBS | RESPIRATION RATE: 16 BRPM | TEMPERATURE: 98.3 F | DIASTOLIC BLOOD PRESSURE: 73 MMHG

## 2022-06-06 DIAGNOSIS — M65.9 SYNOVITIS OF KNEE: ICD-10-CM

## 2022-06-06 DIAGNOSIS — M25.461 KNEE EFFUSION, RIGHT: Primary | ICD-10-CM

## 2022-06-06 PROCEDURE — 99283 EMERGENCY DEPT VISIT LOW MDM: CPT

## 2022-06-06 PROCEDURE — 73562 X-RAY EXAM OF KNEE 3: CPT

## 2022-06-06 RX ORDER — CHOLECALCIFEROL TAB 125 MCG (5000 UNIT) 125 MCG
1 TAB ORAL DAILY
COMMUNITY

## 2022-06-06 RX ORDER — ACETAMINOPHEN 500 MG
1000 TABLET ORAL
Qty: 20 TABLET | Refills: 0 | Status: SHIPPED | OUTPATIENT
Start: 2022-06-06

## 2022-06-06 RX ORDER — AMLODIPINE AND VALSARTAN 5; 160 MG/1; MG/1
1 TABLET ORAL DAILY
COMMUNITY
Start: 2022-03-15

## 2022-06-06 RX ORDER — VITAMIN E 1000 UNIT
1000 CAPSULE ORAL
COMMUNITY

## 2022-06-06 RX ORDER — UREA 10 %
1 LOTION (ML) TOPICAL
COMMUNITY

## 2022-06-06 RX ORDER — NAPROXEN 500 MG/1
500 TABLET ORAL 2 TIMES DAILY WITH MEALS
Qty: 14 TABLET | Refills: 0 | Status: SHIPPED | OUTPATIENT
Start: 2022-06-06 | End: 2022-06-13

## 2022-06-06 RX ORDER — PROBENECID AND COLCHICINE 500; .5 MG/1; MG/1
1 TABLET ORAL
COMMUNITY

## 2022-06-06 NOTE — ED TRIAGE NOTES
Triage: pt c/o right knee swelling and tender to touch x >1 week. Pt concerned for DVT and was also told he had a Baker's cysts in knee that ruptured x1 month ago. Hx of DVTs. Denies any other injury or fall.

## 2022-06-06 NOTE — ED PROVIDER NOTES
The history is provided by the patient. Knee Swelling   This is a new problem. The current episode started more than 1 week ago. The problem occurs constantly. The problem has been gradually worsening. The pain is present in the right knee. The quality of the pain is described as aching. The pain is mild. Associated symptoms include stiffness. Pertinent negatives include full range of motion. The symptoms are aggravated by palpation, movement and standing. He has tried nothing for the symptoms. There has been no history of extremity trauma (was playing [employer] when pain started). Past Medical History:   Diagnosis Date    Chronic kidney disease     Stage 3-4    Diabetes ([facility] [address]     Gout     Hypertension        No past surgical history on file. History reviewed. No pertinent family history. Social History     Socioeconomic History    Marital status: SINGLE     Spouse name: Not on file    Number of children: Not on file    Years of education: Not on file    Highest education level: Not on file   Occupational History    Not on file   Tobacco Use    Smoking status: Former Smoker    Smokeless tobacco: Never Used   Substance and Sexual Activity    Alcohol use: Never    Drug use: Never    Sexual activity: Not on file   Other Topics Concern    Not on file   Social History Narrative    Not on file     Social Determinants of Health     Financial Resource Strain:     Difficulty of Paying Living Expenses: Not on file   Food Insecurity:     Worried About [address] in the Last Year: Not on file    [address] in the Last Year: Not on file   Transportation Needs:     Lack of Transportation (Medical): Not on file    Lack of Transportation (Non-Medical):  Not on file   Physical Activity:     Days of Exercise per Week: Not on file    Minutes of Exercise per Session: Not on file   Stress:     Feeling of Stress : Not on file   Social Connections:     Frequency of Communication with Friends and Family: Not on file    Frequency of Social Gatherings with Friends and Family: Not on file    Attends Yazidi Services: Not on file    Active Member of Clubs or Organizations: Not on file    Attends Club or Organization Meetings: Not on file    Marital Status: Not on file   Intimate Partner Violence:     Fear of Current or Ex-Partner: Not on file    Emotionally Abused: Not on file    Physically Abused: Not on file    Sexually Abused: Not on file   Housing Stability:     Unable to Pay for Housing in the Last Year: Not on file    Number of [place] in the Last Year: Not on file    Unstable Housing in the Last Year: Not on file         ALLERGIES: Patient has no known allergies. Review of Systems   Musculoskeletal: Positive for stiffness. All other systems reviewed and are negative. Vitals:    06/06/22 1030 06/06/22 1205   BP: 139/73 121/73   Pulse: (!) 58    Resp: 16 16   Temp: 98.3 °F (36.8 °C)    SpO2: 97% 99%   Weight: 101.7 kg (224 lb 3.3 oz)    Height: 5' 11\" (1.803 m)             Physical Exam  Vitals and nursing note reviewed. Constitutional:       General: He is not in acute distress. Appearance: He is well-developed. HENT:      Head: Normocephalic and atraumatic. Eyes:      Conjunctiva/sclera: Conjunctivae normal.   Neck:      Trachea: No tracheal deviation. Cardiovascular:      Rate and Rhythm: Normal rate and regular rhythm. Pulmonary:      Effort: Pulmonary effort is normal. No respiratory distress. Abdominal:      General: There is no distension. Musculoskeletal:         General: No deformity. Normal range of motion. Cervical back: Neck supple. Right knee: Effusion present. No erythema or bony tenderness. Normal range of motion. Tenderness present. No LCL laxity, MCL laxity or ACL laxity. Skin:     General: Skin is warm and dry. Neurological:      Mental Status: He is alert. Cranial Nerves: No cranial nerve deficit.    Psychiatric: Behavior: Behavior normal.          MDM     73 y.o. male presents with new effusion to right knee with ongoing pain. No evidence of significant arthropathy on x-ray and no bony injury noted. Suspect he has a reactive synovitis that is inflammatory so we will place an Ace wrap and will plan for scheduled NSAID therapy and orthopedic follow-up to consider intra-articular steroid injection. No signs of gouty arthritis, septic arthritis, or other indication for arthrocentesis currently. Return precautions were discussed for worsening or new concerning symptoms.      Procedures

## 2022-10-10 ENCOUNTER — OFFICE VISIT (OUTPATIENT)
Dept: URGENT CARE | Age: 72
End: 2022-10-10
Payer: MEDICARE

## 2022-10-10 VITALS
HEIGHT: 71 IN | SYSTOLIC BLOOD PRESSURE: 123 MMHG | WEIGHT: 215 LBS | BODY MASS INDEX: 30.1 KG/M2 | TEMPERATURE: 98.5 F | OXYGEN SATURATION: 98 % | HEART RATE: 68 BPM | RESPIRATION RATE: 16 BRPM | DIASTOLIC BLOOD PRESSURE: 78 MMHG

## 2022-10-10 DIAGNOSIS — J20.8 ACUTE BACTERIAL BRONCHITIS: Primary | ICD-10-CM

## 2022-10-10 DIAGNOSIS — B96.89 ACUTE BACTERIAL BRONCHITIS: Primary | ICD-10-CM

## 2022-10-10 LAB — SARS-COV-2 PCR, POC: NEGATIVE

## 2022-10-10 PROCEDURE — G8417 CALC BMI ABV UP PARAM F/U: HCPCS | Performed by: NURSE PRACTITIONER

## 2022-10-10 PROCEDURE — G8427 DOCREV CUR MEDS BY ELIG CLIN: HCPCS | Performed by: NURSE PRACTITIONER

## 2022-10-10 PROCEDURE — 1101F PT FALLS ASSESS-DOCD LE1/YR: CPT | Performed by: NURSE PRACTITIONER

## 2022-10-10 PROCEDURE — G8432 DEP SCR NOT DOC, RNG: HCPCS | Performed by: NURSE PRACTITIONER

## 2022-10-10 PROCEDURE — G8536 NO DOC ELDER MAL SCRN: HCPCS | Performed by: NURSE PRACTITIONER

## 2022-10-10 PROCEDURE — 1123F ACP DISCUSS/DSCN MKR DOCD: CPT | Performed by: NURSE PRACTITIONER

## 2022-10-10 PROCEDURE — 99203 OFFICE O/P NEW LOW 30 MIN: CPT | Performed by: NURSE PRACTITIONER

## 2022-10-10 PROCEDURE — 3017F COLORECTAL CA SCREEN DOC REV: CPT | Performed by: NURSE PRACTITIONER

## 2022-10-10 PROCEDURE — 87635 SARS-COV-2 COVID-19 AMP PRB: CPT | Performed by: NURSE PRACTITIONER

## 2022-10-10 RX ORDER — DOXYCYCLINE 100 MG/1
100 TABLET ORAL 2 TIMES DAILY
Qty: 14 TABLET | Refills: 0 | Status: SHIPPED | OUTPATIENT
Start: 2022-10-10 | End: 2022-10-17

## 2022-10-10 RX ORDER — BENZONATATE 200 MG/1
200 CAPSULE ORAL
Qty: 21 CAPSULE | Refills: 0 | Status: SHIPPED | OUTPATIENT
Start: 2022-10-10 | End: 2022-10-17

## 2022-10-10 NOTE — PROGRESS NOTES
Subjective: (As above and below)     The patient/guardian gave verbal consent to treat. Chief Complaint   Patient presents with    Concern For COVID-19 (Coronavirus)     Pt. C/o cough and stuffy nose with slight headaches starting 1 week ago. Unsure of exposure      Celia Quispe is a 73 y.o. male who presents for evaluation of : cough and mild headache. Symptom onset 1 week ago \"chest cold\" not improving . Preceding illness: none. No other identified aggravating or alleviating factors. Symptoms are intermittent throughout the dya. Promotes no decrease in PO intake of fluids. Denies: severe lethargy, SOB, vomiting/diarrhea, chest pain, chest pain with breathing, severe headache, fevers . Known Exposure to COVID-19: patient states he would like a covid 19 test. Was at a large event a couple days ago and wants to make sure he didn't  covid      ROS  Review of Systems - negative except as listed above    Reviewed PmHx, RxHx, FmHx, SocHx, AllgHx and updated in chart. History reviewed. No pertinent family history. Past Medical History:   Diagnosis Date    Chronic kidney disease     Stage 3-4    Diabetes ([facility] [address]     Gout     Hypertension       Social History     Socioeconomic History    Marital status: SINGLE   Tobacco Use    Smoking status: Former    Smokeless tobacco: Never   Substance and Sexual Activity    Alcohol use: Never    Drug use: Never          Current Outpatient Medications   Medication Sig    doxycycline (ADOXA) 100 mg tablet Take 1 Tablet by mouth two (2) times a day for 7 days. benzonatate (TESSALON) 200 mg capsule Take 1 Capsule by mouth three (3) times daily as needed for Cough for up to 7 days. cholecalciferol (VITAMIN D3) (5000 Units/125 mcg) tab tablet Take 1 Tablet by mouth daily. ascorbic acid, vitamin C, (VITAMIN C) 1,000 mg tablet Take 1,000 mg by mouth daily as needed.     probenecid-colchicine (ColBenemid) 500-0.5 mg per tablet Take 1 Tablet by mouth daily as needed. amLODIPine-valsartan (EXFORGE) 5-160 mg per tablet Take 1 Tablet by mouth daily. zinc sulfate 50 mg zinc (220 mg) tablet Take 1 Tablet by mouth daily as needed. GLUTATHIONE Take 50 mg by mouth daily. (Patient not taking: Reported on 10/10/2022)    acetaminophen (TYLENOL) 500 mg tablet Take 2 Tablets by mouth every six (6) hours as needed for Pain or Fever. amLODIPine (NORVASC) 5 mg tablet Take 1 Tab by mouth daily. (Patient not taking: Reported on 10/10/2022)    losartan (COZAAR) 50 mg tablet Take 1 Tab by mouth daily. (Patient not taking: Reported on 10/10/2022)     No current facility-administered medications for this visit. Objective:     Vitals:    10/10/22 1111   BP: 123/78   Pulse: 68   Resp: 16   Temp: 98.5 °F (36.9 °C)   SpO2: 98%   Weight: 215 lb (97.5 kg)   Height: 5' 11\" (1.803 m)       Physical Exam  General appearance - appears well hydrated and does not appear toxic, no acute distress  Eyes - EOMs intact. Non injected. No scleral icterus   Ears - no external swelling. TMs normal bilat. Nose -  No purulent drainage  Mouth - OP clear without swelling, exudate or lesion. Mucus membranes moist. Uvula midline. Neck/Lymphatics - trachea midline, full AROM, no LAD of neck  Chest - Normal breathing effort no wheeze rales, rhonchi or diminishments bilaterally. Heart - RRR, no murmurs  Skin - no observable rashes or pallor  Neurologic- alert and oriented x 3  Psychiatric- normal mood, behavior and though content. Assessment/ Plan:     1. Acute bacterial bronchitis    - doxycycline (ADOXA) 100 mg tablet; Take 1 Tablet by mouth two (2) times a day for 7 days. Dispense: 14 Tablet; Refill: 0  - benzonatate (TESSALON) 200 mg capsule; Take 1 Capsule by mouth three (3) times daily as needed for Cough for up to 7 days. Dispense: 21 Capsule;  Refill: 0  - POCT COVID-19, SARS-COV-2, PCR      Covid 19 test result today negative  Will treat with doxycycline for bacterial bronchitis given 1 week duration without improvement. Ddx sinusitis, early LRI  Tessalon for cough relief  Maintain adequate fluid intake      Test Results:  Recent Results (from the past 6 hour(s))   POCT COVID-19, SARS-COV-2, PCR    Collection Time: 10/10/22 12:11 PM   Result Value Ref Range    SARS-COV-2 PCR, POC Negative Negative       Follow up: Follow up immediately for any new, worsening or changes or if symptoms are not improving over the next 5-7 days.          Shreyas Harrison, NP

## 2022-10-11 ENCOUNTER — OFFICE VISIT (OUTPATIENT)
Dept: URGENT CARE | Age: 72
End: 2022-10-11
Payer: MEDICARE

## 2022-10-11 VITALS
DIASTOLIC BLOOD PRESSURE: 65 MMHG | HEIGHT: 71 IN | OXYGEN SATURATION: 96 % | SYSTOLIC BLOOD PRESSURE: 105 MMHG | RESPIRATION RATE: 16 BRPM | BODY MASS INDEX: 30.1 KG/M2 | TEMPERATURE: 99.8 F | HEART RATE: 71 BPM | WEIGHT: 215 LBS

## 2022-10-11 DIAGNOSIS — J40 BRONCHITIS: Primary | ICD-10-CM

## 2022-10-11 DIAGNOSIS — N18.4 STAGE 4 CHRONIC KIDNEY DISEASE (HCC): ICD-10-CM

## 2022-10-11 LAB
FLUAV+FLUBV AG NOSE QL IA.RAPID: NEGATIVE
FLUAV+FLUBV AG NOSE QL IA.RAPID: NEGATIVE
VALID INTERNAL CONTROL?: YES

## 2022-10-11 PROCEDURE — 3017F COLORECTAL CA SCREEN DOC REV: CPT | Performed by: NURSE PRACTITIONER

## 2022-10-11 PROCEDURE — G8417 CALC BMI ABV UP PARAM F/U: HCPCS | Performed by: NURSE PRACTITIONER

## 2022-10-11 PROCEDURE — G8536 NO DOC ELDER MAL SCRN: HCPCS | Performed by: NURSE PRACTITIONER

## 2022-10-11 PROCEDURE — 1101F PT FALLS ASSESS-DOCD LE1/YR: CPT | Performed by: NURSE PRACTITIONER

## 2022-10-11 PROCEDURE — 1123F ACP DISCUSS/DSCN MKR DOCD: CPT | Performed by: NURSE PRACTITIONER

## 2022-10-11 PROCEDURE — 87804 INFLUENZA ASSAY W/OPTIC: CPT | Performed by: NURSE PRACTITIONER

## 2022-10-11 PROCEDURE — 99213 OFFICE O/P EST LOW 20 MIN: CPT | Performed by: NURSE PRACTITIONER

## 2022-10-11 PROCEDURE — G8427 DOCREV CUR MEDS BY ELIG CLIN: HCPCS | Performed by: NURSE PRACTITIONER

## 2022-10-11 PROCEDURE — G8432 DEP SCR NOT DOC, RNG: HCPCS | Performed by: NURSE PRACTITIONER

## 2022-10-11 RX ORDER — AMOXICILLIN AND CLAVULANATE POTASSIUM 875; 125 MG/1; MG/1
1 TABLET, FILM COATED ORAL EVERY 12 HOURS
Qty: 14 TABLET | Refills: 0 | Status: SHIPPED | OUTPATIENT
Start: 2022-10-11 | End: 2022-10-18

## 2022-10-11 NOTE — PROGRESS NOTES
Flu  Pertinent negatives include no chest pain and no shortness of breath. Presents with complaints of worsening cough and started having fevers last night. Seen here yesterday,negative covid. Started doxy which he's been taking for 1 day. HE's requesting flu test.    Past Medical History:   Diagnosis Date    Chronic kidney disease     Stage 3-4    Diabetes ([facility] [address]     Gout     Hypertension         History reviewed. No pertinent surgical history. History reviewed. No pertinent family history. Social History     Socioeconomic History    Marital status: SINGLE     Spouse name: Not on file    Number of children: Not on file    Years of education: Not on file    Highest education level: Not on file   Occupational History    Not on file   Tobacco Use    Smoking status: Former    Smokeless tobacco: Never   Substance and Sexual Activity    Alcohol use: Never    Drug use: Never    Sexual activity: Not on file   Other Topics Concern    Not on file   Social History Narrative    Not on file     Social Determinants of Health     Financial Resource Strain: Not on file   Food Insecurity: Not on file   Transportation Needs: Not on file   Physical Activity: Not on file   Stress: Not on file   Social Connections: Not on file   Intimate Partner Violence: Not on file   Housing Stability: Not on file                ALLERGIES: Patient has no known allergies. Review of Systems   Constitutional:  Positive for fatigue and fever. Respiratory:  Positive for cough. Negative for shortness of breath and wheezing. Cardiovascular:  Negative for chest pain. Gastrointestinal:  Negative for diarrhea, nausea and vomiting. Vitals:    10/11/22 1219   BP: 105/65   Pulse: 71   Resp: 16   Temp: 99.8 °F (37.7 °C)   SpO2: 96%   Weight: 215 lb (97.5 kg)   Height: 5' 11\" (1.803 m)       Physical Exam  Constitutional:       General: He is not in acute distress. Appearance: Normal appearance.  He is not ill-appearing or toxic-appearing. HENT:      Head: Normocephalic and atraumatic. Right Ear: Tympanic membrane and ear canal normal.      Left Ear: Tympanic membrane and ear canal normal.      Nose: Nose normal.      Mouth/Throat:      Mouth: Mucous membranes are moist.      Pharynx: Oropharynx is clear. Eyes:      Extraocular Movements: Extraocular movements intact. Conjunctiva/sclera: Conjunctivae normal.      Pupils: Pupils are equal, round, and reactive to light. Cardiovascular:      Rate and Rhythm: Normal rate and regular rhythm. Pulmonary:      Effort: Pulmonary effort is normal.      Breath sounds: Normal breath sounds. Musculoskeletal:      Cervical back: Normal range of motion and neck supple. Lymphadenopathy:      Cervical: No cervical adenopathy. Neurological:      Mental Status: He is alert. Results for orders placed or performed in visit on 10/11/22   AMB POC MIKI INFLUENZA A/B TEST   Result Value Ref Range    VALID INTERNAL CONTROL POC Yes     Influenza A Ag POC Negative Negative    Influenza B Ag POC Negative Negative     XR Results (most recent):  Results from Appointment encounter on 10/11/22    XR CHEST PA LAT    Narrative  EXAM: XR CHEST PA LAT    INDICATION: : Acute cough    COMPARISON: December 11, 2019    TECHNIQUE: PA and lateral chest views    FINDINGS: The cardiomediastinal and hilar contours are within normal limits. The  pulmonary vasculature is within normal limits. The lungs and pleural spaces are clear. The visualized bones and upper abdomen  are age-appropriate. Impression  Normal PA and lateral chest views. ICD-10-CM ICD-9-CM   1. Bronchitis  J40 490   2.  Stage 4 chronic kidney disease (HCC)  N18.4 585.4       Orders Placed This Encounter    XR CHEST PA LAT     Standing Status:   Future     Number of Occurrences:   1     Standing Expiration Date:   11/11/2023    AMB POC MIKI INFLUENZA A/B TEST    amoxicillin-clavulanate (AUGMENTIN) 875-125 mg per tablet Sig: Take 1 Tablet by mouth every twelve (12) hours for 7 days. Dispense:  14 Tablet     Refill:  0      Continue doxy. Add augmentin. Pt has CKD. Advised to contact Dr. Francesco Bruno, nephrologist to discuss antibiotics and his CKD. The patient is to follow up with PCP. If signs and symptoms become worse the pt is to go to the ER.      Hernesto Bird NP       MDM    Procedures

## 2022-10-14 NOTE — PROGRESS NOTES
Spoke with pt. Called in to see if he should continue ABT (doxycycline 100mg ) BID x7days prescribed on Mon, and ABT given on tues, and  paxlovid being prescribed today. Pt. Should continue Doxycycline given on Mon as NP is aware of kidney function.  Pt. Is to

## 2022-10-14 NOTE — PROGRESS NOTES
Pt. Called to see if he should continue medication prescribed on his last 2 urgent care visits. Pt. Was seen on Wed and giving doxycycline 100mg tab and tessalon 200mg- cap for respiratory issues. Pt. Returned the next day still with respiratory issues so he was then prescribed augmentin during that visit . Pt. Called today to see if he should continue Abt therapy prescribed both days, because he test + for covid today and he has kidney failure stage3/4 kidney disease. Pt. Advised per prescriber  to continue medications ordered on Mon which he is aware of kidney function and to follow PCP and nephrologist orders in reference to medications as he will be prescribed paxlovid for + covid result today. Pt. Aware and no other concern was expressed at this time.

## 2023-02-03 ENCOUNTER — HOSPITAL ENCOUNTER (OUTPATIENT)
Dept: PREADMISSION TESTING | Age: 73
End: 2023-02-03
Payer: MEDICARE

## 2023-02-03 VITALS
DIASTOLIC BLOOD PRESSURE: 62 MMHG | HEIGHT: 71 IN | WEIGHT: 212.96 LBS | TEMPERATURE: 98.2 F | HEART RATE: 66 BPM | BODY MASS INDEX: 29.81 KG/M2 | SYSTOLIC BLOOD PRESSURE: 101 MMHG

## 2023-02-03 LAB
ABO + RH BLD: NORMAL
ANION GAP SERPL CALC-SCNC: 0 MMOL/L (ref 5–15)
APPEARANCE UR: CLEAR
BACTERIA URNS QL MICRO: NEGATIVE /HPF
BILIRUB UR QL: NEGATIVE
BLOOD GROUP ANTIBODIES SERPL: NORMAL
BUN SERPL-MCNC: 34 MG/DL (ref 6–20)
BUN/CREAT SERPL: 18 (ref 12–20)
CALCIUM SERPL-MCNC: 9.2 MG/DL (ref 8.5–10.1)
CHLORIDE SERPL-SCNC: 109 MMOL/L (ref 97–108)
CO2 SERPL-SCNC: 27 MMOL/L (ref 21–32)
COLOR UR: NORMAL
CREAT SERPL-MCNC: 1.85 MG/DL (ref 0.7–1.3)
EPITH CASTS URNS QL MICRO: NORMAL /LPF
ERYTHROCYTE [DISTWIDTH] IN BLOOD BY AUTOMATED COUNT: 11.9 % (ref 11.5–14.5)
EST. AVERAGE GLUCOSE BLD GHB EST-MCNC: 117 MG/DL
GLUCOSE SERPL-MCNC: 84 MG/DL (ref 65–100)
GLUCOSE UR STRIP.AUTO-MCNC: NEGATIVE MG/DL
HBA1C MFR BLD: 5.7 % (ref 4–5.6)
HCT VFR BLD AUTO: 44.7 % (ref 36.6–50.3)
HGB BLD-MCNC: 14.3 G/DL (ref 12.1–17)
HGB UR QL STRIP: NEGATIVE
HYALINE CASTS URNS QL MICRO: NORMAL /LPF (ref 0–5)
INR PPP: 1.1 (ref 0.9–1.1)
KETONES UR QL STRIP.AUTO: NEGATIVE MG/DL
LEUKOCYTE ESTERASE UR QL STRIP.AUTO: NEGATIVE
MCH RBC QN AUTO: 30.4 PG (ref 26–34)
MCHC RBC AUTO-ENTMCNC: 32 G/DL (ref 30–36.5)
MCV RBC AUTO: 94.9 FL (ref 80–99)
NITRITE UR QL STRIP.AUTO: NEGATIVE
NRBC # BLD: 0 K/UL (ref 0–0.01)
NRBC BLD-RTO: 0 PER 100 WBC
PH UR STRIP: 5.5 (ref 5–8)
PLATELET # BLD AUTO: 241 K/UL (ref 150–400)
PMV BLD AUTO: 10.1 FL (ref 8.9–12.9)
POTASSIUM SERPL-SCNC: 5.6 MMOL/L (ref 3.5–5.1)
PROT UR STRIP-MCNC: NEGATIVE MG/DL
PROTHROMBIN TIME: 11.4 SEC (ref 9–11.1)
RBC # BLD AUTO: 4.71 M/UL (ref 4.1–5.7)
RBC #/AREA URNS HPF: NORMAL /HPF (ref 0–5)
SODIUM SERPL-SCNC: 136 MMOL/L (ref 136–145)
SP GR UR REFRACTOMETRY: 1.01 (ref 1–1.03)
SPECIMEN EXP DATE BLD: NORMAL
UA: UC IF INDICATED,UAUC: NORMAL
UROBILINOGEN UR QL STRIP.AUTO: 0.2 EU/DL (ref 0.2–1)
WBC # BLD AUTO: 6.9 K/UL (ref 4.1–11.1)
WBC URNS QL MICRO: NORMAL /HPF (ref 0–4)

## 2023-02-03 PROCEDURE — 36415 COLL VENOUS BLD VENIPUNCTURE: CPT

## 2023-02-03 PROCEDURE — 93005 ELECTROCARDIOGRAM TRACING: CPT

## 2023-02-03 PROCEDURE — 81001 URINALYSIS AUTO W/SCOPE: CPT

## 2023-02-03 PROCEDURE — 80048 BASIC METABOLIC PNL TOTAL CA: CPT

## 2023-02-03 PROCEDURE — 86900 BLOOD TYPING SEROLOGIC ABO: CPT

## 2023-02-03 PROCEDURE — 85027 COMPLETE CBC AUTOMATED: CPT

## 2023-02-03 PROCEDURE — 83036 HEMOGLOBIN GLYCOSYLATED A1C: CPT

## 2023-02-03 PROCEDURE — 85610 PROTHROMBIN TIME: CPT

## 2023-02-03 RX ORDER — ROSUVASTATIN CALCIUM 5 MG/1
5 TABLET, COATED ORAL
COMMUNITY

## 2023-02-03 RX ORDER — GLIPIZIDE 5 MG/1
5 TABLET ORAL DAILY
COMMUNITY

## 2023-02-03 NOTE — PERIOP NOTES
[address] [facility] INSTRUCTIONS  ORTHOPAEDIC    Surgery Date:   2/10/23    Your surgeon's office or [facility] staff will call you between 4 PM- 8 PM the day before surgery with your arrival time. If your surgery is on a Monday, you will receive a call the preceding Friday. Please report to [facility] Patient Access/Admitting on the 1st floor. Bring your insurance card, photo identification, and any copayment (if applicable). If you are going home the same day of your surgery, you must have a responsible adult to drive you home. You need to have a responsible adult to stay with you the first 24 hours after surgery and you should not drive a car for 24 hours following your surgery. Do NOT eat any solid foods after midnight the night before surgery including candy, mints or gum. You may drink clear liquids from midnight until 1 hour prior to arrival time. You may drink up to 12 ounces at one time every 4 hours. Do NOT drink alcohol or smoke 24 hours before surgery. STOP smoking for 14 days prior as it helps with breathing and healing after surgery. If your arrival time is 3pm or later, you may eat a light breakfast before 8am (toast, bagel-no butter, black coffee, plain tea, fruit juice-no pulp) Please note special instructions, if applicable, below for medications. If you are being admitted to the hospital,please leave personal belongings/luggage in your car until you have an assigned hospital room number. Please wear comfortable clothes. Wear your glasses instead of contacts. We ask that all money, jewelry and valuables be left at home. Wear no make up, particularly mascara, the day of surgery. All body piercings, rings, and jewelry need to be removed and left at home. Please remove any nail polish or artificial nails from your fingernails. Please wear your hair loose or down. Please no pony-tails, buns, or any metal hair accessories.  If you shower the morning of surgery, please do not apply any lotions or powders afterwards. You may wear deodorant. Do not shave any body area within 24 hours of your surgery. Please follow all instructions to avoid any potential surgical cancellation. Should your physical condition change, (i.e. fever, cold, flu, etc.) please notify your surgeon as soon as possible. It is important to be on time. If a situation occurs where you may be delayed, please call:  [phone] / [phone] on the day of surgery. The Preadmission Testing staff can be reached at [phone]. Special instructions: NONE    Current Outpatient Medications   Medication Sig    glipiZIDE (GLUCOTROL) 5 mg tablet Take 5 mg by mouth daily. rosuvastatin (CRESTOR) 5 mg tablet Take 5 mg by mouth nightly. cholecalciferol (VITAMIN D3) (5000 Units/125 mcg) tab tablet Take 5,000 Units by mouth daily. ascorbic acid, vitamin C, (VITAMIN C) 1,000 mg tablet Take 1,000 mg by mouth daily as needed. amLODIPine-valsartan (EXFORGE) 5-160 mg per tablet Take 1 Tablet by mouth daily. zinc sulfate 50 mg zinc (220 mg) tablet Take 1 Tablet by mouth daily as needed. acetaminophen (TYLENOL) 500 mg tablet Take 2 Tablets by mouth every six (6) hours as needed for Pain or Fever. No current facility-administered medications for this encounter. YOU MUST ONLY TAKE THESE MEDICATIONS THE MORNING OF SURGERY WITH A SIP OF WATER: NONE  MEDICATIONS TO TAKE THE MORNING OF SURGERY ONLY IF NEEDED: NONE  HOLD these prescription medications BEFORE Surgery: NONE  Ask your surgeon/prescribing physician about when/if to STOP taking these medications: NONE  Stop any non-steroidal anti-inflammatory drugs (i.e. Ibuprofen, Naproxen, Advil, Aleve) 3 days before surgery. You may take Tylenol. STOP all vitamins and herbal supplements 1 week prior to  surgery.   If you are currently taking Plavix, Coumadin, or any other blood-thinning/anticoagulant medication contact your prescribing physician for instructions. Preventing Infections Before and After - Your Surgery    IMPORTANT INSTRUCTIONS    You play an important role in your health and preparation for surgery. To reduce the germs on your skin you will need to shower with CHG soap (Chorhexidine gluconate 4%) two times before surgery. CHG soap (Hibiclens, Hex-A-Clens or store brand)  CHG soap will be provided at your Preadmission Testing (PAT) appointment. If you do not have a PAT appointment before surgery, you may arrange to  CHG soap from our office or purchase CHG soap at a pharmacy, grocery or department store. You need to purchase TWO 4 ounce bottles to use for your 2 showers. Steps to follow:  Krista Serve your hair with your normal shampoo and your body with regular soap and rinse well to remove shampoo and soap from your skin. Wet a clean washcloth and turn off the shower. Put CHG soap on washcloth and apply to your entire body from the neck down. Do not use on your head, face or private parts(genitals). Do not use CHG soap on open sores, wounds or areas of skin irritation. Wash you body gently for 5 minutes. Do not wash your skin too hard. This soap does not create lather. Pay special attention to your underarms and from your belly button to your feet. Turn the shower back on and rinse well to get CHG soap off your body. Pat your skin dry with a clean, dry towel. Do not apply lotions or moisturizer. Put on clean clothes and sleep on fresh bed sheets and do not allow pets to sleep with you. Shower with CHG soap 2 times before your surgery  The evening before your surgery  The morning of your surgery      Tips to help prevent infections after your surgery:  Protect your surgical wound from germs:  Hand washing is the most important thing you and your caregivers can do to prevent infections. Keep your bandage clean and dry! Do not touch your surgical wound.   Use clean, freshly washed towels and washcloths every time you shower; do not share bath linens with others. Until your surgical wound is healed, wear clothing and sleep on bed linens each day that are clean and freshly washed. Do not allow pets to sleep in your bed with you or touch your surgical wound. Do not smoke - smoking delays wound healing. This may be a good time to stop smoking. If you have diabetes, it is important for you to manage your blood sugar levels properly before your surgery as well as after your surgery. Poorly managed blood sugar levels slow down wound healing and prevent you from healing completely. Prevention of Infection  Testing for Staphylococcus aureus on your skin before surgery    Staphylococcus aureus (staph) is a common bacteria that is found on the body. It normally does not cause infection on healthy skin. Before surgery, you will be tested to see if you have staph by swabbing the inside of your nose. When you have an incision with surgery, the goal is to protect that incision from infection. Removal of the staph bacteria before surgery can decrease the risk of a surgical site infection. If your nose swab is positive for staph you will be called. Your treatment will include 2 steps:  Prescription for Mupirocin ointment to be used in each nostril twice a day for 5 days. Showering with Chlorhexidine (CHG) liquid soap for 5 days prior to surgery. How to use Mupirocin ointment in your nose   the prescription from your pharmacy. You will receive a large tube of ointment which will be big enough for all of your treatments. You will apply this ointment to each nostril 2 times a day for 5 days. Wash your hands with  gel or soap and water for 20 seconds before using ointment. Place a pea-sized amount of ointment on a cotton Q-tip. Apply ointment just inside of each nostril with the Q-tip. Do not push Q-tip or ointment deep inside you nose. Press your nostrils together and massage for a few seconds.   Wash your hands with  gel or soap and water after you are finished. Do not get ointment near your eyes. If it gets into your eyes, rinse them with cool water. If you need to use nasal spray, clean the tip of the bottle with alcohol before use and do not use both at the same time. If you are scheduled for COVID testing during the 5 days, do NOT apply morning dose until after the COVID test has been performed. How to use Chlorhexidine (CHG) 4% liquid soap  Purchase an 8 ounce bottle of CHG liquid soap (Chlorhexidine 4%, Hibiclens, Hex-A-Clens or store brand) at a pharmacy or grocery store. Wash your hair with your normal shampoo and your body with regular soap and rinse well to remove shampoo and soap from your skin. Wet a clean washcloth and turn off the shower. Put CHG soap on washcloth and apply to your entire body from the neck down. Do not use on your head, face or private parts(genitals). Do not use CHG soap on open sores, wounds or areas of skin irritation. Wash your body gently for 5 minutes. Do not wash your skin too hard. This soap does not create lather. Pay special attention to your underarms and from your belly button to your feet. Turn the shower back on and rinse well to get CHG soap off your body. Pat your skin dry with a clean, dry towel. Do not apply lotions or moisturizer. Put on clean clothes and sleep on fresh bed sheets the night before surgery. Do not allow pets to sleep with you. Eating and Drinking Before Surgery    You may eat a regular dinner at the usual time on the day before your surgery. Do NOT eat any solid foods after midnight unless your arrival time at the hospital is 3pm or later. You may drink clear liquids only from 12 midnight until 1 hours prior to your arrival time at the hospital on the day of your surgery. Do NOT drink alcohol.   Clear liquids include:  Water  Fruit juices without pulp( i.e. apple juice)  Carbonated beverages  Black coffee (no cream/milk)  Tea (no cream/milk)  Gatorade  You may drink up to 12-16 ounces at one time every 4 hours between the hours of midnight and 1 hour before your arrival time at the hospital. Example- if your arrival time at the hospital is 6am, you may drink 12-16 ounces of clear liquids no later than 5am.  If your arrival time at the hospital is 3pm or later, you may eat a light breakfast before 8am.  A light breakfast includes: Toast or bagel (no butter)  Black coffee (no cream/milk)  Tea (no cream/milk)  Fruit juices without pulp ( i.e. apple juice)  Do NOT eat meat, eggs, vegetables or fruit  If you have any questions, please contact your surgeon's office. Patient Information Regarding COVID Restrictions    Day of Procedure    Please park in the parking deck or any designated visitor parking lot. Enter the facility through the Main Entrance of the hospital.  On the day of surgery, please provide the cell phone number of the person who will be waiting for you to the Patient Access representative at the time of registration. Masks are highly recommended in the hospital, but not required. Once your procedure and the immediate recovery period is completed, a nurse in the recovery area will contact your designated visitor to inform them of your room number or to otherwise review other pertinent information regarding your care. Social distancing practices are strongly encouraged in waiting areas and the cafeteria. The patient was contacted in person. He verbalized understanding of all instructions does not  need reinforcement.

## 2023-02-03 NOTE — PERIOP NOTES
COPY OF COVID CARD PLACED ON CHART      CALL TO NEPHROLOGY/DR. BARRY NGEMILY/[phone] AND REQ MOST RECENT OFFICE VISIT NOTES. NOTES REC'D AND PLACED ON CHART.

## 2023-02-04 LAB
ATRIAL RATE: 57 BPM
BACTERIA SPEC CULT: NORMAL
BACTERIA SPEC CULT: NORMAL
CALCULATED P AXIS, ECG09: 34 DEGREES
CALCULATED R AXIS, ECG10: -53 DEGREES
CALCULATED T AXIS, ECG11: 3 DEGREES
DIAGNOSIS, 93000: NORMAL
P-R INTERVAL, ECG05: 134 MS
Q-T INTERVAL, ECG07: 418 MS
QRS DURATION, ECG06: 100 MS
QTC CALCULATION (BEZET), ECG08: 406 MS
SERVICE CMNT-IMP: NORMAL
VENTRICULAR RATE, ECG03: 57 BPM

## 2023-02-06 NOTE — PERIOP NOTES
PAT Nurse Practitioner   Pre-Operative Chart Review/Assessment:-ORTHOPEDIC                Patient Name:  Robert Recio                                                           Age:   81 y.o.    :  1950     Today's Date:  2023     Date of PAT:   2/3/23      Date of Surgery:    2/10/23      Procedure(s):  Right Total Shoulder Arthroplasty     Surgeon:   Dr. Claudene High                       PLAN:      1)  Medical Clearance/PCP:  Jakub Curiel MD      2)  Cardiac Clearance:  EKG and METs reviewed. No further cardiac evaluation requested. PAT EKG showed sinus nicolette; HR-57, LAFB, and incomplete RBBB. No change from previous tracing. 3)  Diabetic Treatment Consult:  Not indicated. A1c-5.7      4)  Sleep Apnea evaluation:   Not indicated. AKUA Score 3.       5) Treatment for MRSA/Staph Aureus:  Neg      6) Additional Concerns:  Former smoker, HTN, T2DM, CKD 4(Cr-1.85 on PAT labs), hx of DVT, hx of bladder CA.    *K+ 5.6 on PAT labs; pt followed by Dr. Sherrell Warren). LOV 22, note on chart. PAT labs routed to nephro for continuity of care.  POC chem 8 ordered for pre-op DOS to recheck K+*                Vital Signs:         Vitals:    23 1348   BP: 101/62   Pulse: 66   Temp: 98.2 °F (36.8 °C)   Weight: 96.6 kg (212 lb 15.4 oz)   Height: 5' 11\" (1.803 m)          Body mass index is 29.7 kg/m².         ____________________________________________  PAST MEDICAL HISTORY  Past Medical History:   Diagnosis Date    Arthritis     SHOULDERS, KNEES    Cancer ([address]     BLADDER TUMOR    Chronic kidney disease     Stage 3-4    Diabetes ([address]     Gout     Hypertension     Thromboembolus ([address] 2021    X3 TO RT LEG AFTER A BROKEN ANKLE INJURY      ____________________________________________  PAST SURGICAL HISTORY  Past Surgical History:   Procedure Laterality Date    HX ORTHOPAEDIC Right     ORIF TIBIAL FX    HX UROLOGICAL      TURB      ____________________________________________  HOME MEDICATIONS  Current Outpatient Medications   Medication Sig    glipiZIDE (GLUCOTROL) 5 mg tablet Take 5 mg by mouth daily. rosuvastatin (CRESTOR) 5 mg tablet Take 5 mg by mouth nightly. cholecalciferol (VITAMIN D3) (5000 Units/125 mcg) tab tablet Take 5,000 Units by mouth daily. ascorbic acid, vitamin C, (VITAMIN C) 1,000 mg tablet Take 1,000 mg by mouth daily as needed. amLODIPine-valsartan (EXFORGE) 5-160 mg per tablet Take 1 Tablet by mouth daily. zinc sulfate 50 mg zinc (220 mg) tablet Take 1 Tablet by mouth daily as needed. acetaminophen (TYLENOL) 500 mg tablet Take 2 Tablets by mouth every six (6) hours as needed for Pain or Fever.      No current facility-administered medications for this encounter.      ____________________________________________  ALLERGIES  No Known Allergies   ____________________________________________  SOCIAL HISTORY  Social History     Tobacco Use    Smoking status: Former     Types: Cigarettes     Quit date: 1984     Years since quittin.1    Smokeless tobacco: Never   Substance Use Topics    Alcohol use: Not Currently      ____________________________________________   Internal Administration   First Dose COVID-19, PFIZER Bivalent BOOSTER, (age 12y+), IM, 30 mcg/0.3 mL dose  2022   Second Dose         Last COVID Lab No results found for: Noel Masters, RCV2CT, CVD2M, COV2, XPLCVT, [id number], [id number], COVV, Chad Grant Outpatient Visit on 2023   Component Date Value Ref Range Status    Sodium 2023 136  136 - 145 mmol/L Final    Potassium 2023 5.6 (A)  3.5 - 5.1 mmol/L Final    Chloride 2023 109 (A)  97 - 108 mmol/L Final    CO2 2023 27  21 - 32 mmol/L Final    Anion gap 2023 0 (A)  5 - 15 mmol/L Final    Glucose 2023 84  65 - 100 mg/dL Final    BUN 2023 34 (A)  6 - 20 MG/DL Final    Creatinine 2023 1.85 (A)  0.70 - 1.30 MG/DL Final BUN/Creatinine ratio 02/03/2023 18  12 - 20   Final    eGFR 02/03/2023 39 (A)  >60 ml/min/1.73m2 Final    Comment:      Pediatric calculator link: Dominick.at. org/professionals/kdoqi/gfr_calculatorped       These results are not intended for use in patients <25years of age. eGFR results are calculated without a race factor using  the 2021 CKD-EPI equation. Careful clinical correlation is recommended, particularly when comparing to results calculated using previous equations. The CKD-EPI equation is less accurate in patients with extremes of muscle mass, extra-renal metabolism of creatinine, excessive creatine ingestion, or following therapy that affects renal tubular secretion. Calcium 02/03/2023 9.2  8.5 - 10.1 MG/DL Final    WBC 02/03/2023 6.9  4.1 - 11.1 K/uL Final    RBC 02/03/2023 4.71  4.10 - 5.70 M/uL Final    HGB 02/03/2023 14.3  12.1 - 17.0 g/dL Final    HCT 02/03/2023 44.7  36.6 - 50.3 % Final    MCV 02/03/2023 94.9  80.0 - 99.0 FL Final    MCH 02/03/2023 30.4  26.0 - 34.0 PG Final    MCHC 02/03/2023 32.0  30.0 - 36.5 g/dL Final    RDW 02/03/2023 11.9  11.5 - 14.5 % Final    PLATELET 84/40/9420 237  150 - 400 K/uL Final    MPV 02/03/2023 10.1  8.9 - 12.9 FL Final    NRBC 02/03/2023 0.0  0  WBC Final    ABSOLUTE NRBC 02/03/2023 0.00  0.00 - 0.01 K/uL Final    Crossmatch Expiration 02/03/2023 02/13/2023,2359   Final    ABO/Rh(D) 02/03/2023 A POSITIVE   Final    Antibody screen 02/03/2023 NEG   Final    INR 02/03/2023 1.1  0.9 - 1.1   Final    A single therapeutic range for Vit K antagonists may not be optimal for all indications - see June, 2008 issue of Chest, American College of Chest Physicians Evidence-Based Clinical Practice Guidelines, 8th Edition.     Prothrombin time 02/03/2023 11.4 (A)  9.0 - 11.1 sec Final    Color 02/03/2023 YELLOW/STRAW    Final    Color Reference Range: Straw, Yellow or Dark Yellow    Appearance 02/03/2023 CLEAR  CLEAR   Final    Specific gravity 02/03/2023 1.013  1.003 - 1.030   Final    pH (UA) 02/03/2023 5.5  5.0 - 8.0   Final    Protein 02/03/2023 Negative  NEG mg/dL Final    Glucose 02/03/2023 Negative  NEG mg/dL Final    Ketone 02/03/2023 Negative  NEG mg/dL Final    Bilirubin 02/03/2023 Negative  NEG   Final    Blood 02/03/2023 Negative  NEG   Final    Urobilinogen 02/03/2023 0.2  0.2 - 1.0 EU/dL Final    Nitrites 02/03/2023 Negative  NEG   Final    Leukocyte Esterase 02/03/2023 Negative  NEG   Final    UA:UC IF INDICATED 02/03/2023 CULTURE NOT INDICATED BY UA RESULT  CNI   Final    WBC 02/03/2023 0-4  0 - 4 /hpf Final    RBC 02/03/2023 0-5  0 - 5 /hpf Final    Epithelial cells 02/03/2023 FEW  FEW /lpf Final    Epithelial cell category consists of squamous cells and /or transitional urothelial cells. Renal tubular cells, if present, are separately identified as such.     Bacteria 02/03/2023 Negative  NEG /hpf Final    Hyaline cast 02/03/2023 0-2  0 - 5 /lpf Final    Ventricular Rate 02/03/2023 57  BPM Final    Atrial Rate 02/03/2023 57  BPM Final    P-R Interval 02/03/2023 134  ms Final    QRS Duration 02/03/2023 100  ms Final    Q-T Interval 02/03/2023 418  ms Final    QTC Calculation (Bezet) 02/03/2023 406  ms Final    Calculated P Axis 02/03/2023 34  degrees Final    Calculated R Axis 02/03/2023 -53  degrees Final    Calculated T Axis 02/03/2023 3  degrees Final    Diagnosis 02/03/2023    Final                    Value:Sinus bradycardia  Incomplete right bundle branch block  Left anterior fascicular block  Minimal voltage criteria for LVH, may be normal variant ( R in aVL )  Abnormal ECG  When compared with ECG of 09-AUG-2009 22:54,  No significant change was found  Confirmed by Erick Hubbard ([id number]) on 2/4/2023 7:11:42 AM      Hemoglobin A1c 02/03/2023 5.7 (A)  4.0 - 5.6 % Final    Comment: NEW METHOD  PLEASE NOTE NEW REFERENCE RANGE  (NOTE)  HbA1C Interpretive Ranges  <5.7              Normal  5.7 - 6.4         Consider Prediabetes  >6.5 Consider Diabetes      Est. average glucose 02/03/2023 117  mg/dL Final    Special Requests: 02/03/2023 NO SPECIAL REQUESTS    Final    Culture result: 02/03/2023 MRSA NOT PRESENT    Final    Culture result: 02/03/2023     Final                    Value:Screening of patient nares for MRSA is for surveillance purposes and, if positive, to facilitate isolation considerations in high risk settings. It is not intended for automatic decolonization interventions per se as regimens are not sufficiently effective to warrant routine use. Skin:     Denies open wounds, cuts, sores, rashes or other areas of concern in PAT assessment.           Devang Lindsey NP  Available via [facility]

## 2023-02-09 NOTE — H&P
Patient ID: Taina Tapia is a 81 y.o. male. Pain Score: 7   Chief Complaint: Pain of the Right Shoulder    HISTORY OF PRESENT OF ILLNESS:  Thomas Paulino is a 81 y.o. male who presents for pain of the right shoulder. The Pt states that the injection a year ago provided relief for 2-3 weeks. He states that he continues to play pickleball. The Pt states that he does not currently take an anti-inflammatory regularly. He is limited by it at nighttime although he has push through. It is now getting to a point where he is really uncomfortable. He has failed conservative management with injections, anti-inflammatories, activity modification, and rest. He would like to talk about doing something more    The Pt also received an injection in his knee from [facility] which he states provided him with significant relief. He states that there is some tenderness, but he is able to work with it. Past Medical History:   Diagnosis Date   Chronic kidney disease   Diabetes mellitus   Gout   Hypertension     Past Surgical History:   Procedure Laterality Date   FOOT SURGERY     Family History   Problem Relation Age of Onset   Clotting disorder Neg Hx   Diabetes Neg Hx   Anesthesia problems Neg Hx   Coronary artery disease Neg Hx     Social History     Tobacco Use   Smoking status: Never   Smokeless tobacco: Never   Substance Use Topics   Alcohol use: Not Currently   Drug use: Not Currently     Review of Systems   1/10/2023    Constitutional: Unexplained: Negative  Genitourinary: Frequent Urination: Negative  HEENT: Vision Loss: Negative  Neurological: Memory Loss: Negative  Integumentary: Rash: Negative  Cardiovascular: Palpatations: Negative  Hematologic: Bruises/Bleeds Easily: Negative  Gastrointestinal: Constipation: Negative  Immunological: Seasonal Allergies: Negative  Musculoskeletal: Joint Pain: Positive  Objective:     Vitals:   01/10/23 1121   Weight: 205 lb   Height: 5' 11.5\"     Constitutional: No acute distress.  Well nourished. Well developed. Psychiatric: Alert and oriented x3. Eyes: Sclera are nonicteric. Respiratory: No labored breathing. Cardiovascular: No marked edema. Skin: No marked skin ulcers. Neurological: No marked sensory loss noted. Patient Active Problem List    Diagnosis Date Noted    Stage 4 chronic kidney disease ([facility] [address] 10/11/2022    Proteinuria 2020     Past Medical History:   Diagnosis Date    Arthritis     SHOULDERS, KNEES    Cancer ([facility] [address]     BLADDER TUMOR    Chronic kidney disease     Stage 3-4    Diabetes ([facility] [address]     Gout     Hypertension     Thromboembolus ([facility] [address] 2021    X3 TO RT LEG AFTER A BROKEN ANKLE INJURY      Past Surgical History:   Procedure Laterality Date    HX ORTHOPAEDIC Right     ORIF TIBIAL FX    HX UROLOGICAL  2018    TURB      Prior to Admission medications    Medication Sig Start Date End Date Taking? Authorizing Provider   glipiZIDE (GLUCOTROL) 5 mg tablet Take 5 mg by mouth daily. Provider, Historical   rosuvastatin (CRESTOR) 5 mg tablet Take 5 mg by mouth nightly. Provider, Historical   cholecalciferol (VITAMIN D3) (5000 Units/125 mcg) tab tablet Take 5,000 Units by mouth daily. Other, MD Luana   ascorbic acid, vitamin C, (VITAMIN C) 1,000 mg tablet Take 1,000 mg by mouth daily as needed. Luana Lew MD   amLODIPine-valsartan (EXFORGE) 5-160 mg per tablet Take 1 Tablet by mouth daily. 3/15/22   Luana Lew MD   zinc sulfate 50 mg zinc (220 mg) tablet Take 1 Tablet by mouth daily as needed. Luana Lew MD   acetaminophen (TYLENOL) 500 mg tablet Take 2 Tablets by mouth every six (6) hours as needed for Pain or Fever.  22   Edilma Aldana MD     No Known Allergies   Social History     Tobacco Use    Smoking status: Former     Types: Cigarettes     Quit date:      Years since quittin.1    Smokeless tobacco: Never   Substance Use Topics    Alcohol use: Not Currently      Family History   Problem Relation Age of Onset    No Known Problems Mother No Known Problems Father     Anesth Problems Neg Hx             No data found. General appearance: alert, cooperative, no distress, appears stated age  Head: Normocephalic, without obvious abnormality, atraumatic  Lungs: clear to auscultation bilaterally  Heart: regular rate and rhythm, S1, S2 normal, no murmur  Abdomen: soft, non-tender. Bowel sounds normal. No masses,  no organomegaly  Extremities: Right shoulder shows limited RoM. He has forward flexion to 130, abduction to 70, and external rotation to 40. There are no skin changes, rashes, deformity, or bruising. He has grossly full strength. He has pain on rotator cuff strength testing. He has normal stability. He has good distal arm musculature. He has no edema. He has good pulses, good cap refill and good sensation distally. Pulses: 2+ and symmetric  Neurologic: Grossly normal      Radiographs:   Order: XR SHOULDER 2+ VW RIGHT - Indication: Localized osteoarthritis of   shoulder, right    X-ray shoulder right 2+ views ([id number])    Result Date: 1/11/2023  Elizabeth ARIZMENDI, Axillary. Impression: X-ray shows evidence of severe glenohumeral arthritis with progression since last visit. He has bone-on-bone arthritis with a significant inferior spur, flattening of humeral head, and symmetrical glenoid wear without medialization. Otherwise normal anatomic alignment of bones with no other evidence of degenerative change, congenital deformity or masses, and no fractures or dislocations seen. Assessment:     1. Localized osteoarthritis of shoulder, right     Patient Active Problem List   Diagnosis   Localized osteoarthritis of shoulder, right   Primary osteoarthritis of left knee     Plan:     Went back and forth about his shoulder.  Initially I did not think he was bad enough to do anything about but as he discussed how much she has limited his certainly seems like he is at a point where it is affecting his daily activity and he is having significant quality of life issues. His x-rays show severe glenohumeral arthritis with bone-on-bone contact and large spurring as well as symmetrical glenoid wear. He has failed conservative management with injections, anti-inflammatories, rest, and activity modification. He would like to move forward with total shoulder arthroplasty. We discussed that the x-rays indicate a progression of his arthritis from the last appointment. We discussed that the only conservative methods that we can continue are cortisone injections or anti-inflammatory medication. We discussed that if they are no longer providing relief, the next step is a TSA. We suggested the Pt take advil/aleve prior to playing pickleball or other activity. We had a long discussion about that today including the risks and benefits of total shoulder arthroplasty. We talked about the risk of neurovascular injury as well as bleeding infection. Talked about postop recovery including the need to be in a sling for 4-6 weeks and limitation on external rotation to 0 degrees. Thus talked about the limitation on active resisted internal rotation in the fact that he could do that for 6 weeks afterward. It was clear that the goal of total shoulder was to improved pain control and that motion was not a guarantee. I made clear that he would gain functional motion but could not promise complete total motion. They understood this. Patient would like to proceed with total shoulder arthroplasty. Mayra Jim MD     Date of Surgery Update:  Sylvie Bernheim was seen and examined. History and physical has been reviewed. The patient has been examined. There have been no significant clinical changes since the completion of the originally dated History and Physical.  Patient identified by surgeon; surgical site was confirmed by patient and surgeon.     Signed By: Yesi) Alexis Patel MD     February 10, 2023 7:45 AM

## 2023-02-10 ENCOUNTER — HOSPITAL ENCOUNTER (OUTPATIENT)
Age: 73
Setting detail: OUTPATIENT SURGERY
Discharge: HOME HEALTH | End: 2023-02-10
Attending: ORTHOPAEDIC SURGERY | Admitting: ORTHOPAEDIC SURGERY
Payer: MEDICARE

## 2023-02-10 ENCOUNTER — ANESTHESIA (OUTPATIENT)
Dept: SURGERY | Age: 73
End: 2023-02-10
Payer: MEDICARE

## 2023-02-10 ENCOUNTER — ANESTHESIA EVENT (OUTPATIENT)
Dept: SURGERY | Age: 73
End: 2023-02-10
Payer: MEDICARE

## 2023-02-10 VITALS
HEART RATE: 72 BPM | WEIGHT: 212.96 LBS | TEMPERATURE: 98.6 F | HEIGHT: 71 IN | DIASTOLIC BLOOD PRESSURE: 62 MMHG | BODY MASS INDEX: 29.81 KG/M2 | OXYGEN SATURATION: 92 % | RESPIRATION RATE: 18 BRPM | SYSTOLIC BLOOD PRESSURE: 105 MMHG

## 2023-02-10 DIAGNOSIS — M19.011 PRIMARY OSTEOARTHRITIS OF RIGHT SHOULDER: ICD-10-CM

## 2023-02-10 DIAGNOSIS — M19.012 PRIMARY OSTEOARTHRITIS OF LEFT SHOULDER: Primary | ICD-10-CM

## 2023-02-10 LAB
ANION GAP BLD CALC-SCNC: 12 MMOL/L (ref 10–20)
CA-I BLD-MCNC: 1.21 MMOL/L (ref 1.12–1.32)
CHLORIDE BLD-SCNC: 108 MMOL/L (ref 98–107)
CO2 BLD-SCNC: 23.3 MMOL/L (ref 21–32)
CREAT BLD-MCNC: 1.84 MG/DL (ref 0.6–1.3)
GLUCOSE BLD STRIP.AUTO-MCNC: 175 MG/DL (ref 65–117)
GLUCOSE BLD STRIP.AUTO-MCNC: 87 MG/DL (ref 65–117)
GLUCOSE BLD-MCNC: 83 MG/DL (ref 65–100)
POTASSIUM BLD-SCNC: 4.6 MMOL/L (ref 3.5–5.1)
SERVICE CMNT-IMP: ABNORMAL
SERVICE CMNT-IMP: ABNORMAL
SERVICE CMNT-IMP: NORMAL
SODIUM BLD-SCNC: 142 MMOL/L (ref 136–145)

## 2023-02-10 PROCEDURE — 77030014006 HC SPNG HEMSTAT J&J -A: Performed by: ORTHOPAEDIC SURGERY

## 2023-02-10 PROCEDURE — 74011000250 HC RX REV CODE- 250: Performed by: NURSE ANESTHETIST, CERTIFIED REGISTERED

## 2023-02-10 PROCEDURE — 97535 SELF CARE MNGMENT TRAINING: CPT

## 2023-02-10 PROCEDURE — 97165 OT EVAL LOW COMPLEX 30 MIN: CPT

## 2023-02-10 PROCEDURE — 74011250636 HC RX REV CODE- 250/636: Performed by: NURSE ANESTHETIST, CERTIFIED REGISTERED

## 2023-02-10 PROCEDURE — C1713 ANCHOR/SCREW BN/BN,TIS/BN: HCPCS | Performed by: ORTHOPAEDIC SURGERY

## 2023-02-10 PROCEDURE — 74011250636 HC RX REV CODE- 250/636: Performed by: ANESTHESIOLOGY

## 2023-02-10 PROCEDURE — 76210000026 HC REC RM PH II 1 TO 1.5 HR: Performed by: ORTHOPAEDIC SURGERY

## 2023-02-10 PROCEDURE — 77030002933 HC SUT MCRYL J&J -A: Performed by: ORTHOPAEDIC SURGERY

## 2023-02-10 PROCEDURE — 74011000250 HC RX REV CODE- 250: Performed by: ORTHOPAEDIC SURGERY

## 2023-02-10 PROCEDURE — 77030018074 HC RTVR SUT ARTH4 S&N -B: Performed by: ORTHOPAEDIC SURGERY

## 2023-02-10 PROCEDURE — 76060000037 HC ANESTHESIA 3 TO 3.5 HR: Performed by: ORTHOPAEDIC SURGERY

## 2023-02-10 PROCEDURE — 97530 THERAPEUTIC ACTIVITIES: CPT

## 2023-02-10 PROCEDURE — 76210000000 HC OR PH I REC 2 TO 2.5 HR: Performed by: ORTHOPAEDIC SURGERY

## 2023-02-10 PROCEDURE — 82962 GLUCOSE BLOOD TEST: CPT

## 2023-02-10 PROCEDURE — C9290 INJ, BUPIVACAINE LIPOSOME: HCPCS | Performed by: ANESTHESIOLOGY

## 2023-02-10 PROCEDURE — 74011000258 HC RX REV CODE- 258: Performed by: NURSE ANESTHETIST, CERTIFIED REGISTERED

## 2023-02-10 PROCEDURE — C1776 JOINT DEVICE (IMPLANTABLE): HCPCS | Performed by: ORTHOPAEDIC SURGERY

## 2023-02-10 PROCEDURE — 77030031139 HC SUT VCRL2 J&J -A: Performed by: ORTHOPAEDIC SURGERY

## 2023-02-10 PROCEDURE — 77030003890 HC BIT DRL TWST MCRA -A: Performed by: ORTHOPAEDIC SURGERY

## 2023-02-10 PROCEDURE — 74011250637 HC RX REV CODE- 250/637: Performed by: ANESTHESIOLOGY

## 2023-02-10 PROCEDURE — 77030008684 HC TU ET CUF COVD -B: Performed by: ANESTHESIOLOGY

## 2023-02-10 PROCEDURE — 2709999900 HC NON-CHARGEABLE SUPPLY: Performed by: ORTHOPAEDIC SURGERY

## 2023-02-10 PROCEDURE — 74011000250 HC RX REV CODE- 250: Performed by: ANESTHESIOLOGY

## 2023-02-10 PROCEDURE — A4565 SLINGS: HCPCS | Performed by: ORTHOPAEDIC SURGERY

## 2023-02-10 PROCEDURE — 76010000173 HC OR TIME 3 TO 3.5 HR INTENSV-TIER 1: Performed by: ORTHOPAEDIC SURGERY

## 2023-02-10 PROCEDURE — 74011250636 HC RX REV CODE- 250/636: Performed by: ORTHOPAEDIC SURGERY

## 2023-02-10 PROCEDURE — 77030006822 HC BLD SAW SAG BRSM -B: Performed by: ORTHOPAEDIC SURGERY

## 2023-02-10 PROCEDURE — 74011000272 HC RX REV CODE- 272: Performed by: ORTHOPAEDIC SURGERY

## 2023-02-10 PROCEDURE — 77030002922 HC SUT FBRWRE ARTH -B: Performed by: ORTHOPAEDIC SURGERY

## 2023-02-10 PROCEDURE — 77030040361 HC SLV COMPR DVT MDII -B: Performed by: ORTHOPAEDIC SURGERY

## 2023-02-10 PROCEDURE — 80047 BASIC METABLC PNL IONIZED CA: CPT

## 2023-02-10 PROCEDURE — 77030026438 HC STYL ET INTUB CARD -A: Performed by: ANESTHESIOLOGY

## 2023-02-10 DEVICE — SMARTSET GMV HIGH PERFORMANCE GENTAMICIN MEDIUM VISCOSITY BONE CEMENT 40G
Type: IMPLANTABLE DEVICE | Site: SHOULDER | Status: FUNCTIONAL
Brand: SMARTSET

## 2023-02-10 DEVICE — SHOULDER S1 TOT STD ANAT IMPL CAPPED S1 ZIM: Type: IMPLANTABLE DEVICE | Status: FUNCTIONAL

## 2023-02-10 DEVICE — IMPLANTABLE DEVICE
Type: IMPLANTABLE DEVICE | Site: SHOULDER | Status: FUNCTIONAL
Brand: COMPREHENSIVE SHOULDER SYSTEM

## 2023-02-10 DEVICE — GLENOID SHLDR FOSSA PROSTHESIS PREFAB 4 PEG: Type: IMPLANTABLE DEVICE | Site: SHOULDER | Status: FUNCTIONAL

## 2023-02-10 DEVICE — IMPLANTABLE DEVICE
Type: IMPLANTABLE DEVICE | Site: SHOULDER | Status: FUNCTIONAL
Brand: ALLIANCE™ TRABECULAR METAL™

## 2023-02-10 DEVICE — IMPLANTABLE DEVICE
Type: IMPLANTABLE DEVICE | Site: SHOULDER | Status: FUNCTIONAL
Brand: COMPREHENSIVE® SHOULDER SYSTEM

## 2023-02-10 RX ORDER — GUAIFENESIN 100 MG/5ML
81 LIQUID (ML) ORAL DAILY
Qty: 14 TABLET | Refills: 0 | Status: SHIPPED
Start: 2023-02-10

## 2023-02-10 RX ORDER — ONDANSETRON 2 MG/ML
4 INJECTION INTRAMUSCULAR; INTRAVENOUS AS NEEDED
Status: DISCONTINUED | OUTPATIENT
Start: 2023-02-10 | End: 2023-02-10 | Stop reason: HOSPADM

## 2023-02-10 RX ORDER — ROPIVACAINE HYDROCHLORIDE 5 MG/ML
30 INJECTION, SOLUTION EPIDURAL; INFILTRATION; PERINEURAL AS NEEDED
Status: DISCONTINUED | OUTPATIENT
Start: 2023-02-10 | End: 2023-02-10 | Stop reason: HOSPADM

## 2023-02-10 RX ORDER — PHENYLEPHRINE HCL IN 0.9% NACL 0.4MG/10ML
SYRINGE (ML) INTRAVENOUS AS NEEDED
Status: DISCONTINUED | OUTPATIENT
Start: 2023-02-10 | End: 2023-02-10 | Stop reason: HOSPADM

## 2023-02-10 RX ORDER — DIPHENHYDRAMINE HYDROCHLORIDE 50 MG/ML
12.5 INJECTION, SOLUTION INTRAMUSCULAR; INTRAVENOUS AS NEEDED
Status: DISCONTINUED | OUTPATIENT
Start: 2023-02-10 | End: 2023-02-10 | Stop reason: HOSPADM

## 2023-02-10 RX ORDER — SODIUM CHLORIDE 0.9 % (FLUSH) 0.9 %
5-40 SYRINGE (ML) INJECTION AS NEEDED
Status: DISCONTINUED | OUTPATIENT
Start: 2023-02-10 | End: 2023-02-10 | Stop reason: HOSPADM

## 2023-02-10 RX ORDER — MIDAZOLAM HYDROCHLORIDE 1 MG/ML
1 INJECTION, SOLUTION INTRAMUSCULAR; INTRAVENOUS AS NEEDED
Status: DISCONTINUED | OUTPATIENT
Start: 2023-02-10 | End: 2023-02-10 | Stop reason: HOSPADM

## 2023-02-10 RX ORDER — HYDROMORPHONE HYDROCHLORIDE 1 MG/ML
0.5 INJECTION, SOLUTION INTRAMUSCULAR; INTRAVENOUS; SUBCUTANEOUS
Status: DISCONTINUED | OUTPATIENT
Start: 2023-02-10 | End: 2023-02-10 | Stop reason: HOSPADM

## 2023-02-10 RX ORDER — SODIUM CHLORIDE 9 MG/ML
INJECTION, SOLUTION INTRAVENOUS
Status: DISCONTINUED | OUTPATIENT
Start: 2023-02-10 | End: 2023-02-10 | Stop reason: HOSPADM

## 2023-02-10 RX ORDER — MIDAZOLAM HYDROCHLORIDE 1 MG/ML
INJECTION, SOLUTION INTRAMUSCULAR; INTRAVENOUS AS NEEDED
Status: DISCONTINUED | OUTPATIENT
Start: 2023-02-10 | End: 2023-02-10 | Stop reason: HOSPADM

## 2023-02-10 RX ORDER — BUPIVACAINE HYDROCHLORIDE 5 MG/ML
INJECTION, SOLUTION EPIDURAL; INTRACAUDAL
Status: COMPLETED | OUTPATIENT
Start: 2023-02-10 | End: 2023-02-10

## 2023-02-10 RX ORDER — GLYCOPYRROLATE 0.2 MG/ML
INJECTION INTRAMUSCULAR; INTRAVENOUS AS NEEDED
Status: DISCONTINUED | OUTPATIENT
Start: 2023-02-10 | End: 2023-02-10 | Stop reason: HOSPADM

## 2023-02-10 RX ORDER — SODIUM CHLORIDE, SODIUM LACTATE, POTASSIUM CHLORIDE, CALCIUM CHLORIDE 600; 310; 30; 20 MG/100ML; MG/100ML; MG/100ML; MG/100ML
100 INJECTION, SOLUTION INTRAVENOUS CONTINUOUS
Status: DISCONTINUED | OUTPATIENT
Start: 2023-02-10 | End: 2023-02-10 | Stop reason: HOSPADM

## 2023-02-10 RX ORDER — NEOSTIGMINE METHYLSULFATE 1 MG/ML
INJECTION, SOLUTION INTRAVENOUS AS NEEDED
Status: DISCONTINUED | OUTPATIENT
Start: 2023-02-10 | End: 2023-02-10 | Stop reason: HOSPADM

## 2023-02-10 RX ORDER — FENTANYL CITRATE 50 UG/ML
INJECTION, SOLUTION INTRAMUSCULAR; INTRAVENOUS AS NEEDED
Status: DISCONTINUED | OUTPATIENT
Start: 2023-02-10 | End: 2023-02-10 | Stop reason: HOSPADM

## 2023-02-10 RX ORDER — SODIUM CHLORIDE 9 MG/ML
25 INJECTION, SOLUTION INTRAVENOUS CONTINUOUS
Status: DISCONTINUED | OUTPATIENT
Start: 2023-02-10 | End: 2023-02-10 | Stop reason: HOSPADM

## 2023-02-10 RX ORDER — ROCURONIUM BROMIDE 10 MG/ML
INJECTION, SOLUTION INTRAVENOUS AS NEEDED
Status: DISCONTINUED | OUTPATIENT
Start: 2023-02-10 | End: 2023-02-10 | Stop reason: HOSPADM

## 2023-02-10 RX ORDER — MORPHINE SULFATE 2 MG/ML
2 INJECTION, SOLUTION INTRAMUSCULAR; INTRAVENOUS
Status: DISCONTINUED | OUTPATIENT
Start: 2023-02-10 | End: 2023-02-10 | Stop reason: HOSPADM

## 2023-02-10 RX ORDER — PROPOFOL 10 MG/ML
INJECTION, EMULSION INTRAVENOUS AS NEEDED
Status: DISCONTINUED | OUTPATIENT
Start: 2023-02-10 | End: 2023-02-10 | Stop reason: HOSPADM

## 2023-02-10 RX ORDER — FENTANYL CITRATE 50 UG/ML
50 INJECTION, SOLUTION INTRAMUSCULAR; INTRAVENOUS AS NEEDED
Status: DISCONTINUED | OUTPATIENT
Start: 2023-02-10 | End: 2023-02-10 | Stop reason: HOSPADM

## 2023-02-10 RX ORDER — DEXAMETHASONE SODIUM PHOSPHATE 4 MG/ML
INJECTION, SOLUTION INTRA-ARTICULAR; INTRALESIONAL; INTRAMUSCULAR; INTRAVENOUS; SOFT TISSUE AS NEEDED
Status: DISCONTINUED | OUTPATIENT
Start: 2023-02-10 | End: 2023-02-10 | Stop reason: HOSPADM

## 2023-02-10 RX ORDER — EPHEDRINE SULFATE/0.9% NACL/PF 50 MG/5 ML
SYRINGE (ML) INTRAVENOUS AS NEEDED
Status: DISCONTINUED | OUTPATIENT
Start: 2023-02-10 | End: 2023-02-10 | Stop reason: HOSPADM

## 2023-02-10 RX ORDER — FENTANYL CITRATE 50 UG/ML
25 INJECTION, SOLUTION INTRAMUSCULAR; INTRAVENOUS
Status: DISCONTINUED | OUTPATIENT
Start: 2023-02-10 | End: 2023-02-10 | Stop reason: HOSPADM

## 2023-02-10 RX ORDER — LIDOCAINE HYDROCHLORIDE 10 MG/ML
0.1 INJECTION, SOLUTION EPIDURAL; INFILTRATION; INTRACAUDAL; PERINEURAL AS NEEDED
Status: DISCONTINUED | OUTPATIENT
Start: 2023-02-10 | End: 2023-02-10 | Stop reason: HOSPADM

## 2023-02-10 RX ORDER — SODIUM CHLORIDE 0.9 % (FLUSH) 0.9 %
5-40 SYRINGE (ML) INJECTION EVERY 8 HOURS
Status: DISCONTINUED | OUTPATIENT
Start: 2023-02-10 | End: 2023-02-10 | Stop reason: HOSPADM

## 2023-02-10 RX ORDER — ACETAMINOPHEN 325 MG/1
650 TABLET ORAL ONCE
Status: COMPLETED | OUTPATIENT
Start: 2023-02-10 | End: 2023-02-10

## 2023-02-10 RX ORDER — MIDAZOLAM HYDROCHLORIDE 1 MG/ML
0.5 INJECTION, SOLUTION INTRAMUSCULAR; INTRAVENOUS
Status: DISCONTINUED | OUTPATIENT
Start: 2023-02-10 | End: 2023-02-10 | Stop reason: HOSPADM

## 2023-02-10 RX ORDER — OXYCODONE HYDROCHLORIDE 5 MG/1
5 TABLET ORAL
Qty: 41 TABLET | Refills: 0 | Status: SHIPPED | OUTPATIENT
Start: 2023-02-10 | End: 2023-02-17

## 2023-02-10 RX ORDER — LIDOCAINE HYDROCHLORIDE 20 MG/ML
INJECTION, SOLUTION EPIDURAL; INFILTRATION; INTRACAUDAL; PERINEURAL AS NEEDED
Status: DISCONTINUED | OUTPATIENT
Start: 2023-02-10 | End: 2023-02-10 | Stop reason: HOSPADM

## 2023-02-10 RX ORDER — ONDANSETRON 2 MG/ML
INJECTION INTRAMUSCULAR; INTRAVENOUS AS NEEDED
Status: DISCONTINUED | OUTPATIENT
Start: 2023-02-10 | End: 2023-02-10 | Stop reason: HOSPADM

## 2023-02-10 RX ADMIN — GLYCOPYRROLATE 0.4 MG: 0.2 INJECTION INTRAMUSCULAR; INTRAVENOUS at 11:51

## 2023-02-10 RX ADMIN — MIDAZOLAM 2 MG: 1 INJECTION INTRAMUSCULAR; INTRAVENOUS at 08:40

## 2023-02-10 RX ADMIN — ONDANSETRON HYDROCHLORIDE 4 MG: 2 INJECTION, SOLUTION INTRAMUSCULAR; INTRAVENOUS at 11:36

## 2023-02-10 RX ADMIN — BUPIVACAINE 10 ML: 13.3 INJECTION, SUSPENSION, LIPOSOMAL INFILTRATION at 08:47

## 2023-02-10 RX ADMIN — PHENYLEPHRINE HYDROCHLORIDE 40 MCG/MIN: 10 INJECTION INTRAVENOUS at 09:02

## 2023-02-10 RX ADMIN — Medication 3 MG: at 11:51

## 2023-02-10 RX ADMIN — LIDOCAINE HYDROCHLORIDE 0.1 ML: 10 INJECTION, SOLUTION EPIDURAL; INFILTRATION; INTRACAUDAL; PERINEURAL at 08:17

## 2023-02-10 RX ADMIN — SODIUM CHLORIDE: 900 INJECTION, SOLUTION INTRAVENOUS at 11:30

## 2023-02-10 RX ADMIN — ROCURONIUM BROMIDE 60 MG: 10 SOLUTION INTRAVENOUS at 08:56

## 2023-02-10 RX ADMIN — MIDAZOLAM 2 MG: 1 INJECTION INTRAMUSCULAR; INTRAVENOUS at 08:49

## 2023-02-10 RX ADMIN — GLYCOPYRROLATE 0.3 MG: 0.2 INJECTION INTRAMUSCULAR; INTRAVENOUS at 09:15

## 2023-02-10 RX ADMIN — BUPIVACAINE HYDROCHLORIDE 10 ML: 5 INJECTION, SOLUTION EPIDURAL; INTRACAUDAL; PERINEURAL at 08:47

## 2023-02-10 RX ADMIN — SODIUM CHLORIDE, POTASSIUM CHLORIDE, SODIUM LACTATE AND CALCIUM CHLORIDE 100 ML/HR: 600; 310; 30; 20 INJECTION, SOLUTION INTRAVENOUS at 08:18

## 2023-02-10 RX ADMIN — FENTANYL CITRATE 50 MCG: 50 INJECTION, SOLUTION INTRAMUSCULAR; INTRAVENOUS at 11:48

## 2023-02-10 RX ADMIN — FENTANYL CITRATE 50 MCG: 50 INJECTION, SOLUTION INTRAMUSCULAR; INTRAVENOUS at 08:55

## 2023-02-10 RX ADMIN — ACETAMINOPHEN 650 MG: 325 TABLET ORAL at 08:19

## 2023-02-10 RX ADMIN — PROPOFOL 150 MG: 10 INJECTION, EMULSION INTRAVENOUS at 08:55

## 2023-02-10 RX ADMIN — Medication 5 MG: at 09:23

## 2023-02-10 RX ADMIN — ROCURONIUM BROMIDE 20 MG: 10 SOLUTION INTRAVENOUS at 10:15

## 2023-02-10 RX ADMIN — FENTANYL CITRATE 50 MCG: 50 INJECTION INTRAMUSCULAR; INTRAVENOUS at 08:40

## 2023-02-10 RX ADMIN — LIDOCAINE HYDROCHLORIDE 100 MG: 20 INJECTION, SOLUTION EPIDURAL; INFILTRATION; INTRACAUDAL; PERINEURAL at 08:55

## 2023-02-10 RX ADMIN — WATER 2 G: 1 INJECTION INTRAMUSCULAR; INTRAVENOUS; SUBCUTANEOUS at 09:35

## 2023-02-10 RX ADMIN — Medication 100 MCG: at 09:02

## 2023-02-10 RX ADMIN — DEXAMETHASONE SODIUM PHOSPHATE 4 MG: 4 INJECTION, SOLUTION INTRAMUSCULAR; INTRAVENOUS at 09:06

## 2023-02-10 NOTE — PERIOP NOTES
2511: RT interscalene nerve block done by Dr Nanette Ledbetter using 20cc of: 1.3% Exparel 5cc mixed w/ 5cc of 0.5% bupivicaine. Pt tolerated procedure well. VSS post block: 982/56-04-54, SaO2=95% on 3l/m/nc. Pt to OR immediately after block.

## 2023-02-10 NOTE — ANESTHESIA PREPROCEDURE EVALUATION
Relevant Problems   RENAL FAILURE   (+) Stage 4 chronic kidney disease (HCC)       Anesthetic History   No history of anesthetic complications            Review of Systems / Medical History  Patient summary reviewed, nursing notes reviewed and pertinent labs reviewed    Pulmonary  Within defined limits                 Neuro/Psych   Within defined limits           Cardiovascular  Within defined limits  Hypertension              Exercise tolerance: >4 METS     GI/Hepatic/Renal         Renal disease: CRI       Endo/Other  Within defined limits  Diabetes    Arthritis and cancer     Other Findings            Physical Exam    Airway  Mallampati: II  TM Distance: > 6 cm  Neck ROM: normal range of motion   Mouth opening: Normal     Cardiovascular  Regular rate and rhythm,  S1 and S2 normal,  no murmur, click, rub, or gallop             Dental  No notable dental hx       Pulmonary  Breath sounds clear to auscultation               Abdominal  GI exam deferred       Other Findings            Anesthetic Plan    ASA: 3  Anesthesia type: general      Post-op pain plan if not by surgeon: peripheral nerve block single    Induction: Intravenous  Anesthetic plan and risks discussed with: Patient

## 2023-02-10 NOTE — DISCHARGE INSTRUCTIONS
Total Shoulder Replacement  Post-op Discharge Instructions  Dr. Edelmira Dozier    Diet  Regular diet or usual diet as at home. Drink plenty of fluids. Eat foods high in fiber and protein and low in fat. Avoid alcoholic beverages. Avoid smoking. Activities  You are going home a well person, so be as active as possible. Walk with your sling on as tolerated. During the daytime, get up every hour and take a brief walk. Exercises  Perform your exercise routine 3 times a day as instructed by the physical therapist.  Gradually increase the repetitions of the exercises. You may place an ice bag on your shoulder for 5-10 minutes after exercising. You should walk daily, each time lengthening your walking distance as your strength improves. Medications  Take a multivitamin with iron once a day for a month. Take a stool softener daily (such as Senokot-S or Colace) to prevent constipation while you are taking iron and pain medication. If constipation occurs, take a laxative (such as Dulcolax tablets, Milk of Magnesia, or suppository). For pain control you have been given:      Oxycodone 5mg tablets. Take 1 tablet every 4-6 hours as needed for pain. Take pain medication with food. You will find that you will decrease the amount you use as your pain lessens. You may take ibuprofen/alleve and tylenol with your pain medication. As soon as you are comfortable enough you should wean off of your pain medication and just take tylenol and Ibuprofen. Please take a baby Asprin (81 mg) once a day for two weeks to prevent blood clots. Incision Care    You may take a shower  when you get home. Your dressing is waterproof. That will be removed 1 week after surgery. After removal of the dressing, cover it with a dry dressing. You may continue to shower if the wound is dry. Please thoroughly dry your incision after showering. If there is any drainage do not get her wound wet and keep it covered.   Notify the physician if you are having wound drainage. Wash your hands thoroughly before changing your dressing. Sleeping  Sometimes it is uncomfortable to sleep flat after shoulder replacement surgery. Many people choose to sleep in the seated position either in a chair or recliner, or propped up on pillows in bed. You are free to attempt to sleep  lying flat if you prefer however many choose to sit up. Follow-up Office Visit  Has been set up for you already. Your appointment is on 2/23/2023 @ 1:10pm.  If you  need to change your appointment please call [phone]. Reasons to call the Doctor  1. Increased redness, swelling or drainage from you incision site. 2.  Temperature consistently greater than 101 degrees. 3.  Increased pain or unrelieved pain. 4.  Calf or chest pain      PT  Physical Therapy is set up at [facility] to start at 8am on 2/13/2023. Please arrive before 7:45 to fill out paperwork. Their phone Number is [phone]. Other instructions  Do not take more than 4 Grams of Tylenol in 24 hours. Many pain medications contain Tylenol. Percocet contains 325mg of Tylenol per tablet; Lortab contains 500mg of Tylenol per tablet. Tylenol usage:  325 mg tablets DO NOT take more than 12 tablets in 24 hours                            500mg tablets DO NOT take more than 8 tablets in 24 hoursTotal        ______________________________________________________________________    Anesthesia Discharge Instructions    After general anesthesia or intervenous sedation, for 24 hours or while taking prescription Narcotics:  Limit your activities  Do not drive or operate hazardous machinery  If you have not urinated within 8 hours after discharge, please contact your surgeon on call.   Do not make important personal or business decisions  Do not drink alcoholic beverages    Report the following to your surgeon:  Excessive pain, swelling, redness or odor of or around the surgical area  Temperature over 100.5 degrees  Nausea and vomiting lasting longer than 4 hours or if unable to take medication  Any signs of decreased circulation or nerve impairment to extremity:  Change in color, persistent numbness, tingling, coldness or increased pain.   Any questions

## 2023-02-10 NOTE — PROGRESS NOTES
OT at patient bedside, tolerating well. All questions answered. Discharge instructions reviewed with patient and patients friend.

## 2023-02-10 NOTE — PROGRESS NOTES
[facility]  NEUROSURGERY APC Progress Note      SUBJECTIVE/CC:   Pt seen and examined today for Stroke Alert called earlier for Left hand numbness after OT session as well as dysarthria. Per bedside RN, he had been speaking mostly in English with her but he reverted back to mostly Macedonian. Stat CT head obtained revealing no new hemorrhage (mixed density Left subdural collection appears smaller), but concern for early infarction of Left temporal lobe. Upon return from CT, pt states he is feeling better, \"back to normal\".  Pt denies headache, new visual disturbances, nausea, numbness/tingling at this time, dizziness, or new weakness. Family members at bedside and over the phone report his speech is clear.    OBJECTIVE:   Visit Vitals  /82   Pulse 89   Temp 98.6 °F (37 °C) (Oral)   Resp 18   Ht 5' 8\" (1.727 m)   Wt 60.7 kg (133 lb 12.8 oz)   SpO2 100%   BMI 20.34 kg/m²       Intake/Output Summary (Last 24 hours) at 5/18/2021 1623  Last data filed at 5/18/2021 0800  Gross per 24 hour   Intake 250 ml   Output --   Net 250 ml       CURRENT MEDICATIONS:  Current Facility-Administered Medications   Medication Dose Route Frequency Provider Last Rate Last Admin   • sodium chloride 0.9 % flush bag 25 mL  25 mL Intravenous PRN KENNY Clark       • sodium chloride (PF) 0.9 % injection 2 mL  2 mL Intracatheter 2 times per day KENNY Clark       • polyvinyl alcohol (LIQUIFILM TEARS) 1.4 % ophthalmic solution 1 drop  1 drop Both Eyes 4x Daily PRN Teresa Gonzalez MD       • verapamil tablet 80 mg  80 mg Oral BID Jesus Johnson MD   80 mg at 05/18/21 0849   • erythromycin ophthalmic ointment   Left Eye 4x Daily Eddi Alfonso MD   Given at 05/18/21 9021   • levETIRAcetam (KepPRA) tablet 500 mg  500 mg Oral 2 times per day Hammad Crabtree MD   500 mg at 05/18/21 0847   • enoxaparin (LOVENOX) injection 40 mg  40 mg Subcutaneous Daily Eddi Alfonso MD   40 mg at 05/18/21 0847   • bisacodyl  OCCUPATIONAL THERAPY EVALUATION/DISCHARGE  Patient: Al Ross (20 y.o. male)  Date: 2/10/2023  Primary Diagnosis: Arthritis of right shoulder region [M19.011]  Procedure(s) (LRB):  FAST TRACK RIGHT TOTAL SHOULDER REPLACEMENT (GENERAL W/ EXPAREL BLOCK) (Right) Day of Surgery   Precautions:   NWB (no external rotation past neutral)    ASSESSMENT  Based on the objective data described below, the patient presents with impaired sensation, ROM and strength in R UE s/p POD 0 R TSA. Pt educated NWB status, proper sling donning/doffing, pendulums and dressing techniques for surgical extremity. Pt required increased A for dressing due to non functional RUE 2* block/impaired sensation. Wife present at bedside for all education. Pt mobilized without LOB and is cleared for discharge home. Current Level of Function (ADLs/self-care): SBA, mod A dressing        Other factors to consider for discharge: from home, enjoys pickle ball     PLAN :  Recommendation for discharge: (in order for the patient to meet his/her long term goals)  OP PT for shoulder ROM    This discharge recommendation:  Has been made in collaboration with the attending provider and/or case management    IF patient discharges home will need the following DME: none       SUBJECTIVE:   Patient stated I love to play pickleball.     OBJECTIVE DATA SUMMARY:   HISTORY:   Past Medical History:   Diagnosis Date    Arthritis     SHOULDERS, KNEES    Cancer ([facility] [address]     BLADDER TUMOR    Chronic kidney disease     Stage 3-4    Diabetes ([facility] [address]     Gout     Hypertension     Thromboembolus ([facility] [address] 2021    X3 TO RT LEG AFTER A BROKEN ANKLE INJURY     Past Surgical History:   Procedure Laterality Date    HX ORTHOPAEDIC Right     ORIF TIBIAL FX    HX UROLOGICAL  2018    TURB       Prior Level of Function/Environment/Context: pt lives at home with wife, active and enjoys playing pickleball   Expanded or extensive additional review of patient history:          Hand dominance: (DULCOLAX) suppository 10 mg  10 mg Rectal Daily PRN Natasha Lerum, CNP   10 mg at 05/15/21 0915   • polyethylene glycol (MIRALAX) packet 17 g  17 g Oral Daily Natasha Lerum, CNP   17 g at 05/18/21 0848   • guaiFENesin-DM) (ROBITUSSIN DM) 100-10 MG/5ML syrup 10 mL  10 mL Oral Q4H PRN Natasha Lerum, CNP       • metoPROLOL (LOPRESSOR) injection 5 mg  5 mg Intravenous Q4H PRN Mary Winkler APNP   5 mg at 05/15/21 0254   • pantoprazole (PROTONIX) EC tablet 40 mg  40 mg Oral Nightly Hammad Crabtree MD   40 mg at 05/17/21 2103   • amLODIPine (NORVASC) tablet 2.5 mg  2.5 mg Oral Daily Estefany Mcclure, CNP   2.5 mg at 05/18/21 0847   • prednisoLONE acetate (PRED FORTE) 1 % ophthalmic suspension 1 drop  1 drop Left Eye 4x Daily Hammad Crabtree MD   1 drop at 05/18/21 1347   • ofloxacin (OCUFLOX) 0.3 % ophthalmic solution 1 drop  1 drop Left Eye 4x Daily Hammad Crabtree MD   1 drop at 05/18/21 1324   • HYDROcodone-acetaminophen (NORCO) 5-325 MG per tablet 1 tablet  1 tablet Oral Q4H PRN Hammad Crabtree MD   1 tablet at 05/12/21 2053   • docusate sodium-sennosides (SENOKOT S) 50-8.6 MG 1 tablet  1 tablet Oral Nightly Hammad Crabtree MD   1 tablet at 05/17/21 2103   • bisacodyl (DULCOLAX) EC tablet 5 mg  5 mg Oral Daily PRN Hammad Crabtree MD   5 mg at 05/14/21 0832   • aluminum-magnesium hydroxide-simethicone (MAALOX) 200-200-20 MG/5ML suspension 30 mL  30 mL Oral Q6H PRN Brown Santos MD       • sodium chloride (NORMAL SALINE) 0.9 % bolus 50 mL  50 mL Intravenous Once Jovanny Riley MD   Stopped at 05/11/21 0800   • acetaminophen (TYLENOL) tablet 650 mg  650 mg Oral Q4H PRN Ronnell Lockett, DO   650 mg at 05/18/21 1054   • ondansetron (ZOFRAN) injection 4 mg  4 mg Intravenous Q6H PRN Ronnell Lockett, DO   4 mg at 05/11/21 1813   • Potassium Standard Replacement Protocol   Does not apply See Admin Instructions Wilver Gay MD           REVIEW OF SYSTEMS:  10 point ROS obtained and are negative except for those  Right    EXAMINATION OF PERFORMANCE DEFICITS:  Cognitive/Behavioral Status:  Neurologic State: Alert  Orientation Level: Oriented X4  Cognition: Follows commands  Perception: Appears intact  Perseveration: No perseveration noted  Safety/Judgement: Awareness of environment; Fall prevention    Skin: R shoulder bandage C/D/I    Edema: R shoulder- ice applied     Hearing:       Vision/Perceptual:                           Acuity: Within Defined Limits    Corrective Lenses: Glasses    Range of Motion:     R UE non functional due to nerve block, mild R digit flexion/extension                        Strength:     Non functional R UE                Coordination:     Fine Motor Skills-Upper: Right Impaired;Left Intact    Gross Motor Skills-Upper: Right Impaired;Left Intact    Tone & Sensation:           Impaired 2* nerve block                    Balance:  Sitting: Intact  Standing: Impaired; Without support  Standing - Static: Good  Standing - Dynamic : Fair    Functional Mobility and Transfers for ADLs:  Bed Mobility:  Supine to Sit: Stand-by assistance  Sit to Supine: Stand-by assistance    Transfers:  Sit to Stand: Stand-by assistance  Stand to Sit: Stand-by assistance    ADL Assessment:  Feeding: Minimum assistance    Oral Facial Hygiene/Grooming: Minimum assistance    Bathing: Moderate assistance         Upper Body Dressing: Moderate assistance    Lower Body Dressing: Maximum assistance    Toileting: Moderate assistance                ADL Intervention and task modifications:  Patient instructed and demonstrated shoulder precautions during ADLs min cues. Patient instructed and indicated Excellent understanding propping surgical arm on surface, can back away from arm in order to access axilla to wash, dry, and deodorize. Patient instructed and demonstrated dress R first/undress last with Moderate Assistance.  Patient instructed and demonstrated compensatory strategies to don sling with Moderate Assistance. Cognitive Retraining  Safety/Judgement: Awareness of environment; Fall prevention    Therapeutic Exercise: reviewed each with pt     EXERCISE   Sets   Reps   Active Active Assist   Passive   Comments   Shoulder external rotation   (to neutral)   []                          []                          []                             Elbow flexion/extension   []                          []                          []                             Wrist flexion/extension   []                          []                          []                             Finger flexion/extension   []                          []                          []                             Pendulum      []     []     []          Functional Measure:    Barthel Index:  Bathin  Bladder: 10  Bowels: 10  Groomin  Dressin  Feedin  Mobility: 10  Stairs: 5  Toilet Use: 5  Transfer (Bed to Chair and Back): 10  Total: 60/100      The Barthel ADL Index: Guidelines  1. The index should be used as a record of what a patient does, not as a record of what a patient could do. 2. The main aim is to establish degree of independence from any help, physical or verbal, however minor and for whatever reason. 3. The need for supervision renders the patient not independent. 4. A patient's performance should be established using the best available evidence. Asking the patient, friends/relatives and nurses are the usual sources, but direct observation and common sense are also important. However direct testing is not needed. 5. Usually the patient's performance over the preceding 24-48 hours is important, but occasionally longer periods will be relevant. 6. Middle categories imply that the patient supplies over 50 per cent of the effort. 7. Use of aids to be independent is allowed.     Score Interpretation (from [address] [id number])    Independent   60-79 Minimally independent   40-59 Partially dependent   20-39 Very listed in the HPI    PHYSICAL EXAM:   General: alert, cooperative, in no apparent distress  Neurologic Exam:  GCS 15, oriented x3, speech without dysarthria or aphasia, mixed English and English, follows commands in all extremities  Left eye mostly closed, with ointment, no pupil (chronic), blindness with hazy cornea (baseline)  Right pupil 3mm/round/brisk, EOMI  Face symmetrical, tongue midline, shoulder shrug symmetric  Left cranial incision well-approximated with staples, C/D/I without drainage/fluctuance/erythema  Sensation intact to light touch throughout  No pronator drift noted  BUE and LLE 5/5, RLE 4+ to 5/5  L femoral site is open to air, C/D/I and without signs of swelling, ecchymosis, hematoma or infection    DATA:    Labs:  Recent Labs   Lab 05/18/21  0605   WBC 7.8   RBC 4.94   HGB 14.4   HCT 42.5          Recent Labs   Lab 05/18/21  0605 05/17/21  0547 05/16/21  1425 05/15/21  0607 05/14/21  0455   SODIUM 136  --   --  138 139   POTASSIUM 3.8 3.9 4.0 3.2* 3.4   CHLORIDE 104  --   --  106 108*   CO2 29  --   --  28 26   BUN 12  --   --  10 15   CREATININE 0.86  --   --  0.76 0.77   GLUCOSE 105*  --   --  109* 98   CALCIUM 8.7  --   --  8.4 8.8       Recent Labs   Lab 05/13/21  0533   PTT 23   PT 11.4   INR 1.1       IMAGING: I have personally reviewed all of the following imaging  CT HEAD STROKE ALERT LEVEL 1 WO CONTRAST  1.  Subtle asymmetric low-density left temporal lobe mid to posterior aspect could be an early ischemic infarction (ASPECT score nine). 2.  Mixed density left lateral convexity subdural hematoma appears slightly smaller but there is still opacified millimeters midline shift.  (Stroke physician was notified by phone at 3:28 PM). Electronically Signed by: KATIA TAN M.D. Signed on: 5/18/2021 3:28 PM       IMPRESSION  Patient is a 69 year old yo male with PMH significant for Afib (on coumadin), bilateral glaucoma (left eye blind, right eye slight vision) presenting with left  dependent   <20 Totally dependent     -Michelle Chaudhary, Barthel, D.W. ([id number]). Functional evaluation: the Barthel Index. [address] ([address] (1997). The Barthel activities of daily living index: self-reporting versus actual performance in the old (> or = 75 years). Journal of [facility] 45(7), 14 [facility], ALEXEY, Lianne Fairbanks., Saroj Cabezas. (1999). Measuring the change in disability after inpatient rehabilitation; comparison of the responsiveness of the Barthel Index and Functional [place] Measure. Journal of Neurology, Neurosurgery, and Psychiatry, 66(4), 604-901. Devi Oliveros, N.J.A, ESTHELA Loving, & Emil Leach MVI. (2004) Assessment of post-stroke quality of life in cost-effectiveness studies: The usefulness of the Barthel Index and the EuroQoL-5D. Quality of Life Research, 15, 922-22       Occupational Therapy Evaluation Charge Determination   History Examination Decision-Making   LOW Complexity : Brief history review  LOW Complexity : 1-3 performance deficits relating to physical, cognitive , or psychosocial skils that result in activity limitations and / or participation restrictions  LOW Complexity : No comorbidities that affect functional and no verbal or physical assistance needed to complete eval tasks       Based on the above components, the patient evaluation is determined to be of the following complexity level: LOW   Pain Rating:  No pain     Activity Tolerance:   Good    After treatment patient left in no apparent distress:    Sitting EOB, wife at bedside    COMMUNICATION/EDUCATION:   The patients plan of care was discussed with: Registered nurse.      Thank you for this referral.  Reyna Rodriguez OT  Time Calculation: 30 mins sided subdural hematoma.  Post left crani evacuation, follow up CT head demosntrated recurrence of left SDH, now PPD #2 s/p a left MMA embolization.    POD#8 -  Left craniotomy for SDH Evacuation (Britany, 5/10/21)  PPD#5 -  Cerebral angiogram with left MMA embolization with James material (Alina, 5/13/2021)    RECOMMENDATIONS:  - Cont. Groin/neuro/VS checks per protocol   - SBP goals: 100-160mmHg  - Repeat imaging:    - CTH obtained and reviewed, discussed with Neurology Dr. Summers and operating neurosurgeon Dr. Lema   - MRI brain wo pending to evaluate for acute stroke   - All previous imaging has been reviewed personally and with attending  - Neuro Medications:    - Keppra 500mg BID x 14 days postop  - Per Dr. Lema, would prefer at least 10 days postop prior to resuming AC (POD#10 is 5/20/21); if neurologically stable, OK to resume Coumadin on 5/20/21 and obtain repeat CT head when therapeutic  - Procedures:    - None planned from CV-NSG standpoint at this time   - Labs:   - most recent labs reviewed, routine labs per 10T  - Consults:    - PMR, Cardiology, AM hospitalist  - Therapies:    - PT/OT/ST following, appreciate input   - Activity: ok to AAT, defer to therapy recs  - Diet: ok to ADAT from CV-NSG standpoint, defer to ST recs  - DVT ppx: SCDs, currently on Lovenox ppx  - No AP/NSAIDs x 2 weeks postop    Patient will need to follow up with Dr. Lema in [facility] in 2-4 weeks. Will need follow up CTH prior to appointment. Discharge and depart information completed  - Patient does NOT need to follow up with Dr. Fuller regarding MMA embolization    Appreciate medical management  Dispo: 10T, 6W pending    We will continue to follow peripherally  Please contact [facility] Neurosurgery via ezzai - how to [place] or calling  with questions/concerns      Aliya Morrison PA-C  5/18/2021

## 2023-02-10 NOTE — OP NOTES
Total Shoulder Arthroplasty with Biceps Tenodesis Procedure Note      Patient: Judy Morataya MRN: [id number]  SSN: [id number]    YOB: 1950  Age: 81 y.o. Sex: male      DATE OF SURGERY:  2/10/2023    Indications: This is a 81 y.o. male who presents with right shoulder DJD. The patient was admitted for surgery as conservative measures have failed. Pre-operative Diagnosis:  Arthritis of right shoulder region [M19.011]    Post-operative Diagnosis: Arthritis of right shoulder region [M19.011]    Procedure: right total shoulder arthroplasty with biceps tenodesis    Surgeon: Vincent Prado MD (Jody)     Anesthesia: General    Specimens: * No specimens in log *    Implants:   Implant Name Type Inv. Item Serial No.  Lot No. LRB No. Used Action   CEMENT BNE GENTAMICIN 40 GM SMARTSET GMV - SNA  CEMENT BNE GENTAMICIN 40 GM SMARTSET GMV NA JN [employer]_ [id number] Right 1 Implanted   POST ORTH MOD TRABECULAR MTL ALLIANCE - SNA  POST ORTH MOD TRABECULAR MTL ALLIANCE NA RHODA [employer]_ [id number] Right 1 Implanted   GLENOID SHLDR FOSSA PROSTHESIS PREFAB 4 PEG - SNA  GLENOID SHLDR FOSSA PROSTHESIS PREFAB 4 PEG NA [employer]_ [id number] Right 1 Implanted   STEM HUM [id number] [id number] SHARON SHLDR CO CHROM POR SETH PRESSFIT - SNA  STEM HUM L55MM [id number] SHARON SHLDR CO CHROM POR SETH PRESSFIT NA [employer]_ [id number] Right 1 Implanted   HEAD HUM BPLR [id number] -- VERSA-DIAL - SNA  HEAD HUM BPLR [id number] -- VERSA-DIAL NA [employer]OD[employer]_ [id number] Right 1 Implanted   HEAD HUM VERSA-DIAL STD TAPR --  - SNA  HEAD HUM VERSA-DIAL STD TAPR --  NA RHODA [employer]_ [id number] Right 1 Implanted       Estimated Blood Loss: 100 cc    Procedure Details: Following identification of the patient, The procedure was described to the patient including the risks benefits and possible complications, and the patient signed the informed consent. The patient was taken to the Operative Suite. Following an interscalene block for postoperative pain control, induction of general anesthesia, and 1 gram of intravenous Ancef 2gm, the patient was very carefully positioned in the beachchair fashion. Care was taken to pad both dependent lower extremities. The rightarm was then prepped and draped in a sterile fashion. A standard deltopectoral incision was identified and marked. The skin was incised. The subcutaneous tissue was then dissected down to the cephalic vein. This was dissected proximally and distally and retracted laterally with the deltoid, pectoralis major and strap muscles retracted medially. The biceps tendon was identified and dissected up to the rotator interval.  It was tagged and transected and carefully tenodesed to the pec major tendon. The subscapularis was incised one centimeter medial to the lesser tuberosity the lesser tuberosity and tagged for later reconstruction. The humeral head was then dislocated from the wound. Multiple loose bodies were removed. There is significant wear on both the articular surface of the humeral head and glenoid with osteophytes. Proximal cutting guide placement was then performed. Proximal cut was made. Circumferential osteophytes were resected in their entirety. At this point, the starter reamer was then inserted and reaming was then commenced up to a 15 mm reamer. An 15 mm trial was inserted. A size 50x21 trial head was placed and adjusted and gave excellent coverage. Fluoro was used to insure that proper alignment and fit were obtained. Trial head was then removed. Stem was left in place. Humeral retractors were inserted. Glenoid was exposed. There was noted to be some posterior wear. Glenoid was sized. It was noted that the 4 was the appropriate size. The starter hole was then drilled. Glenoid was then reamed including the high side anteriorly.   Three hole peg glenoid holes were drilled as was the large central peg. Gelfoam and thrombin were used to make bony glenoid as dry as possible. Then the 4 size glenoid was cemented in place and held until cement was dry. Excess cement was removed. Once the glenoid vault was prepared, the 50x21 humeral head trial was trialed under flouro, found to be perfect  At this point, humerus was re-dislocated back from the wound. All trial components were removed. The humeral canal was irrigated and dried. A Biomet short 15 mm stem was inserted with appropriate version. There is excellent stability of the stem. A size 50x21 head was then trailed and gave excellent stability and mobility. The trial component was removed, and the true size 50x21 head was inserted. Shoulder was reduced. There was excellent stability and excellent mobility. At this point, Subscapularis was repaired using #5 fiberwire sutures in modified Steve-Ricardo type fashion and  the rotator interval was approximated using #2 fiberwire sutures. The shoulder was put through a range of motion and there was excellent stability with excellent mobility. At this point, the wound was then copiously irrigated. The skin was closed with 2-0 Vicryl figure-of-eight sutures and a 2-0 Prolene subcuticular stitch. Sterile dressings were applied. The Cryo/Cuff and sling were applied. The patient was then transferred to the Recovery Room in stable condition. Suleman Adames PA-C was of vital assistance during the performance of the procedure. She was essential for positioning the shoulder for the various parts of the procedure and assisting with the exposure of the shoulder, protection of vital structures and the closure of the knee. Condition: Stable    Complications:  None; patient tolerated the procedure well. Signed: Vincent Wagner MD (2/10/2023 at 11:45 AM)

## 2023-02-10 NOTE — ANESTHESIA POSTPROCEDURE EVALUATION
Post-Anesthesia Evaluation and Assessment    Patient: Eden Gold MRN: [id number]  SSN: [id number]    YOB: 1950  Age: 81 y.o. Sex: male      I have evaluated the patient and they are stable and ready for discharge from the PACU. Cardiovascular Function/Vital Signs  Visit Vitals  /61   Pulse (!) 54   Temp 36.8 °C (98.3 °F)   Resp 15   Ht 5' 11\" (1.803 m)   Wt 96.6 kg (212 lb 15.4 oz)   SpO2 92%   BMI 29.70 kg/m²       Patient is status post General anesthesia for Procedure(s):  FAST TRACK RIGHT TOTAL SHOULDER REPLACEMENT (GENERAL W/ EXPAREL BLOCK). Nausea/Vomiting: None    Postoperative hydration reviewed and adequate. Pain:  Pain Scale 1: Numeric (0 - 10) (02/10/23 0753)  Pain Intensity 1: 0 (02/10/23 0753)   Managed    Neurological Status:   Neuro (WDL): Within Defined Limits (02/10/23 0804)   At baseline    Mental Status, Level of Consciousness: Alert and  oriented to person, place, and time    Pulmonary Status:   O2 Device: Nasal cannula (02/10/23 1217)   Adequate oxygenation and airway patent    Complications related to anesthesia: None    Post-anesthesia assessment completed. No concerns    Signed By: Kathleen Hooper MD     February 10, 2023              Procedure(s):  FAST TRACK RIGHT TOTAL SHOULDER REPLACEMENT (GENERAL W/ EXPAREL BLOCK). general, regional    <BSHSIANPOST>    INITIAL Post-op Vital signs:   Vitals Value Taken Time   /68 02/10/23 1225   Temp 36.8 °C (98.3 °F) 02/10/23 1217   Pulse 53 02/10/23 1226   Resp 16 02/10/23 1226   SpO2 93 % 02/10/23 1226   Vitals shown include unvalidated device data.

## 2023-02-10 NOTE — ANESTHESIA PROCEDURE NOTES
Peripheral Block    Start time: 2/10/2023 8:40 AM  End time: 2/10/2023 8:53 AM  Performed by: Donna Ashraf MD  Authorized by: Arabella Beebe MD       Pre-procedure: Indications: at surgeon's request and post-op pain management    Preanesthetic Checklist: patient identified, risks and benefits discussed, site marked, timeout performed, anesthesia consent given, patient being monitored and fire risk safety assessment completed and verbalized    Timeout Time: 08:40 EST      Block Type:   Block Type:   Interscalene  Monitoring:  Standard ASA monitoring, continuous pulse ox, frequent vital sign checks, heart rate, responsive to questions and oxygen  Injection Technique:  Single shot  Procedures: ultrasound guided    Patient Position: supine  Prep: chlorhexidine    Location:  Interscalene  Needle Type:  Stimuplex  Needle Gauge:  22 G  Needle Localization:  Ultrasound guidance  Medication Injected:  Bupivacaine (PF) (MARCAINE) 0.5% injection - Peripheral Nerve Block   10 mL - 2/10/2023 8:47:00 AM  bupivacaine liposome (PF) susp (EXPAREL) infiltration - Peripheral Nerve Block   10 mL - 2/10/2023 8:47:00 AM  Med Admin Time: 2/10/2023 8:47 AM    Assessment:  Number of attempts:  1  Injection Assessment:  Incremental injection every 5 mL, local visualized surrounding nerve on ultrasound, negative aspiration for blood, no paresthesia, negative aspiration for CSF and ultrasound image on chart  Patient tolerance:  Patient tolerated the procedure well with no immediate complications

## 2023-04-01 ENCOUNTER — OFFICE VISIT (OUTPATIENT)
Dept: URGENT CARE | Age: 73
End: 2023-04-01

## 2023-04-01 VITALS
BODY MASS INDEX: 29.54 KG/M2 | SYSTOLIC BLOOD PRESSURE: 121 MMHG | OXYGEN SATURATION: 96 % | WEIGHT: 211 LBS | TEMPERATURE: 98.6 F | HEART RATE: 62 BPM | HEIGHT: 71 IN | RESPIRATION RATE: 18 BRPM | DIASTOLIC BLOOD PRESSURE: 82 MMHG

## 2023-04-01 DIAGNOSIS — R05.8 PRODUCTIVE COUGH: ICD-10-CM

## 2023-04-01 DIAGNOSIS — R05.1 ACUTE COUGH: Primary | ICD-10-CM

## 2023-04-01 LAB
FLUAV+FLUBV AG NOSE QL IA.RAPID: NEGATIVE
FLUAV+FLUBV AG NOSE QL IA.RAPID: NEGATIVE
S PYO AG THROAT QL: NEGATIVE
VALID INTERNAL CONTROL?: YES
VALID INTERNAL CONTROL?: YES

## 2023-04-01 RX ORDER — BENZONATATE 200 MG/1
200 CAPSULE ORAL
Qty: 21 CAPSULE | Refills: 0 | Status: SHIPPED | OUTPATIENT
Start: 2023-04-01 | End: 2023-04-08

## 2023-04-01 RX ORDER — SILDENAFIL CITRATE 20 MG/1
TABLET ORAL
COMMUNITY
Start: 2023-02-06

## 2023-04-01 RX ORDER — AZITHROMYCIN 250 MG/1
TABLET, FILM COATED ORAL
Qty: 6 TABLET | Refills: 0 | Status: SHIPPED | OUTPATIENT
Start: 2023-04-01

## 2023-04-01 RX ORDER — VALACYCLOVIR HYDROCHLORIDE 1 G/1
TABLET, FILM COATED ORAL
COMMUNITY

## 2023-04-01 NOTE — PROGRESS NOTES
Subjective: (As above and below)     The patient/guardian gave verbal consent to treat. Chief Complaint   Patient presents with    URI     Complains of Juan Ramon Cables with chest congestion and slight sore throat. Sx for 1 day. Mally Forte is a 81 y.o. male who presents for evaluation of : chest congestion, productive cough, sore throat. Symptom onset 1-2 days . Preceding illness: none. No other identified aggravating or alleviating factors. Symptoms are constant and overall not improving. Denies: severe SOB, vomiting/diarrhea, rashes, fevers . Known Exposure to COVID-19: no      ROS  Review of Systems - negative except as listed above    Reviewed PmHx, RxHx, FmHx, SocHx, AllgHx and updated in chart. Family History   Problem Relation Age of Onset    No Known Problems Mother     No Known Problems Father     Anesth Problems Neg Hx        Past Medical History:   Diagnosis Date    Arthritis     SHOULDERS, KNEES    Cancer ([facility] [address]     BLADDER TUMOR    Chronic kidney disease     Stage 3-4    Diabetes ([facility] [address]     Gout     Hypertension     Thromboembolus ([facility] [address] 2021    X3 TO RT LEG AFTER A BROKEN ANKLE INJURY      Social History     Socioeconomic History    Marital status: SINGLE   Tobacco Use    Smoking status: Former     Types: Cigarettes     Quit date:      Years since quittin.2    Smokeless tobacco: Never   Vaping Use    Vaping Use: Never used   Substance and Sexual Activity    Alcohol use: Not Currently    Drug use: Never          Current Outpatient Medications   Medication Sig    benzonatate (TESSALON) 200 mg capsule Take 1 Capsule by mouth three (3) times daily as needed for Cough for up to 7 days. azithromycin (ZITHROMAX) 250 mg tablet Take 2 tablets on day 1 then 1 tablet per day for remaining 4 days. Take by mouth.    sildenafiL (REVATIO) 20 mg tablet     valACYclovir (VALTREX) 1 gram tablet valacyclovir 1 gram tablet    aspirin 81 mg chewable tablet Take 1 Tablet by mouth daily. glipiZIDE (GLUCOTROL) 5 mg tablet Take 5 mg by mouth daily. rosuvastatin (CRESTOR) 5 mg tablet Take 5 mg by mouth nightly. cholecalciferol (VITAMIN D3) (5000 Units/125 mcg) tab tablet Take 5,000 Units by mouth daily. ascorbic acid, vitamin C, (VITAMIN C) 1,000 mg tablet Take 1,000 mg by mouth daily as needed. amLODIPine-valsartan (EXFORGE) 5-160 mg per tablet Take 1 Tablet by mouth daily. zinc sulfate 50 mg zinc (220 mg) tablet Take 1 Tablet by mouth daily as needed. acetaminophen (TYLENOL) 500 mg tablet Take 2 Tablets by mouth every six (6) hours as needed for Pain or Fever. No current facility-administered medications for this visit. Objective:     Vitals:    04/01/23 1037   BP: 121/82   Pulse: 62   Resp: 18   Temp: 98.6 °F (37 °C)   SpO2: 96%   Weight: 211 lb (95.7 kg)   Height: 5' 11\" (1.803 m)       Physical Exam  General appearance - appears well hydrated and does not appear toxic, no acute distress  Eyes - EOMs intact. Non injected. No scleral icterus   Ears - no external swelling. TMs normal bilat. Nose - passages patent. No purulent drainage  Mouth - OP clear without swelling, exudate or lesion. Mucus membranes moist. Uvula midline. Neck/Lymphatics - trachea midline, full AROM, no LAD of neck  Chest - Normal breathing effort no wheeze rales, or diminishments bilaterally. Coarse rhonchi R and L lower lobes bilat  Heart - RRR, no murmurs  Skin - no observable rashes or pallor  Neurologic- alert and oriented x 3  Psychiatric- normal mood, behavior and though content. Assessment/ Plan:     1. Acute cough    - AMB POC STREP A DNA, AMP PROBE  - AMB POC INFLUENZA A  AND B REAL-TIME RT-PCR  - XR CHEST PA LAT; Future    2. Productive cough    - benzonatate (TESSALON) 200 mg capsule; Take 1 Capsule by mouth three (3) times daily as needed for Cough for up to 7 days. Dispense: 21 Capsule; Refill: 0  - azithromycin (ZITHROMAX) 250 mg tablet;  Take 2 tablets on day 1 then 1 tablet per day for remaining 4 days. Take by mouth. Dispense: 6 Tablet; Refill: 0      CXR normal. No acute process. See below results  Will cover for possible LRI with azithromycin  Tessalon for cough  Maintain adequate hydration  Rapid flu and strep are both negative. Negative home covid test    Test Results:  Recent Results (from the past 6 hour(s))   AMB POC INFLUENZA A  AND B REAL-TIME RT-PCR    Collection Time: 04/01/23 10:50 AM   Result Value Ref Range    VALID INTERNAL CONTROL POC Yes     Influenza A Ag POC Negative Negative    Influenza B Ag POC Negative Negative   AMB POC STREP A DNA, AMP PROBE    Collection Time: 04/01/23 10:53 AM   Result Value Ref Range    VALID INTERNAL CONTROL POC Yes     Group A Strep Ag Negative Negative     XR Results (most recent):  Results from Appointment encounter on 04/01/23    XR CHEST PA LAT    Narrative  EXAM: XR CHEST PA LAT    INDICATION: Cough for 2 days ronchi in the lower lobes. COMPARISON: 10/11/2022    TECHNIQUE: PA and lateral chest views    FINDINGS: The cardiac size is within normal limits. The pulmonary vasculature is  within normal limits. The lungs and pleural spaces are clear. Status post right shoulder arthroplasty. There is moderate curvature of the thoracic spine. Mild multilevel spondylosis  in the spine. Impression  No evidence of acute process. Follow up: Follow up immediately for any new, worsening or changes or if symptoms are not improving over the next 5-7 days.          Casey Vega NP

## 2023-04-04 ENCOUNTER — OFFICE VISIT (OUTPATIENT)
Dept: URGENT CARE | Age: 73
End: 2023-04-04

## 2023-04-04 RX ORDER — PREDNISONE 10 MG/1
TABLET ORAL
Qty: 21 TABLET | Refills: 0 | Status: SHIPPED
Start: 2023-04-04

## 2023-04-04 RX ORDER — ALBUTEROL SULFATE 90 UG/1
2 AEROSOL, METERED RESPIRATORY (INHALATION)
Qty: 18 G | Refills: 0 | Status: SHIPPED
Start: 2023-04-04

## 2023-04-04 RX ORDER — IPRATROPIUM BROMIDE AND ALBUTEROL SULFATE 2.5; .5 MG/3ML; MG/3ML
3 SOLUTION RESPIRATORY (INHALATION)
Status: COMPLETED
Start: 2023-04-04 | End: 2023-04-04

## 2023-04-04 RX ORDER — PROMETHAZINE HYDROCHLORIDE AND DEXTROMETHORPHAN HYDROBROMIDE 6.25; 15 MG/5ML; MG/5ML
5 SYRUP ORAL
Qty: 60 ML | Refills: 0 | Status: SHIPPED
Start: 2023-04-04

## 2023-04-04 RX ADMIN — IPRATROPIUM BROMIDE AND ALBUTEROL SULFATE 3 ML: 2.5; .5 SOLUTION RESPIRATORY (INHALATION) at 14:43

## 2023-04-04 NOTE — PROGRESS NOTES
Cough  The history is provided by the Patient. This is a new problem. The current episode started more than 2 days ago. The problem occurs constantly. The problem has been gradually worsening. The cough is Productive of sputum. There has been no fever. Associated symptoms include chest pain (tightness), shortness of breath and wheezing. Pertinent negatives include no chills. He has tried cough syrup and antibiotics (zpak and tesselon) for the symptoms. He is not a smoker. His past medical history is significant for bronchitis.       Past Medical History:   Diagnosis Date    Arthritis     SHOULDERS, KNEES    Cancer ([facility] [address]     BLADDER TUMOR    Chronic kidney disease     Stage 3-4    Diabetes ([facility] [address]     Gout     Hypertension     Thromboembolus ([facility] [address] 2021    X3 TO RT LEG AFTER A BROKEN ANKLE INJURY        Past Surgical History:   Procedure Laterality Date    HX ORTHOPAEDIC Right     ORIF TIBIAL FX    HX UROLOGICAL  2018    TURB         Family History   Problem Relation Age of Onset    No Known Problems Mother     No Known Problems Father     Anesth Problems Neg Hx         Social History     Socioeconomic History    Marital status: SINGLE     Spouse name: Not on file    Number of children: Not on file    Years of education: Not on file    Highest education level: Not on file   Occupational History    Not on file   Tobacco Use    Smoking status: Former     Types: Cigarettes     Quit date: 48     Years since quittin.2    Smokeless tobacco: Never   Vaping Use    Vaping Use: Never used   Substance and Sexual Activity    Alcohol use: Not Currently    Drug use: Never    Sexual activity: Not on file   Other Topics Concern    Not on file   Social History Narrative    Not on file     Social Determinants of Health     Financial Resource Strain: Not on file   Food Insecurity: Not on file   Transportation Needs: Not on file   Physical Activity: Not on file   Stress: Not on file   Social Connections: Not on file   Intimate Partner Violence: Not on file   Housing Stability: Not on file                ALLERGIES: Atorvastatin and Lisinopril    Review of Systems   Constitutional:  Negative for chills and fatigue. HENT:  Positive for congestion. Respiratory:  Positive for cough, chest tightness, shortness of breath and wheezing. Cardiovascular:  Positive for chest pain (tightness). All other systems reviewed and are negative. Vitals:    04/04/23 1048   BP: 110/62   Pulse: 97   Resp: 18   Temp: 98.9 °F (37.2 °C)   SpO2: 96%   Weight: 211 lb (95.7 kg)   Height: 5' 11\" (1.803 m)       Physical Exam  Vitals and nursing note reviewed. Constitutional:       General: He is not in acute distress. HENT:      Right Ear: Tympanic membrane and ear canal normal.      Left Ear: Tympanic membrane and ear canal normal.      Nose: Nose normal.      Mouth/Throat:      Pharynx: No oropharyngeal exudate or posterior oropharyngeal erythema. Eyes:      General:         Right eye: No discharge. Left eye: No discharge. Conjunctiva/sclera: Conjunctivae normal.   Pulmonary:      Effort: Pulmonary effort is normal. No respiratory distress. Breath sounds: Decreased air movement and transmitted upper airway sounds present. Decreased breath sounds and rhonchi present. No wheezing or rales. Musculoskeletal:      Cervical back: Neck supple. Lymphadenopathy:      Cervical: No cervical adenopathy. Skin:     Findings: No rash. MDM    Procedures        ICD-10-CM ICD-9-CM    1.  Bronchitis, acute, with bronchospasm  J20.9 466.0         Medications Ordered Today   Medications    albuterol-ipratropium (DUO-NEB) 2.5 MG-0.5 MG/3 ML     Order Specific Question:   MODE OF DELIVERY     Answer:   Nebulizer     Order Specific Question:   Initiate RT Bronchodilator Protocol     Answer:   No    predniSONE (STERAPRED DS) 10 mg dose pack     Sig: As directed     Dispense:  21 Tablet     Refill:  0    albuterol (PROVENTIL HFA, VENTOLIN HFA, PROAIR HFA) 90 mcg/actuation inhaler     Sig: Take 2 Puffs by inhalation every six (6) hours as needed for Wheezing. Dispense:  18 g     Refill:  0    promethazine-dextromethorphan (PROMETHAZINE-DM) 6.25-15 mg/5 mL syrup     Sig: Take 5 mL by mouth nightly as needed for Cough. Dispense:  60 mL     Refill:  0     No results found for any visits on 04/04/23. The patients condition was discussed with the patient and they understand. The patient is to follow up with primary care doctor. If signs and symptoms become worse the pt is to go to the ER. The patient is to take medications as prescribed.

## 2024-01-08 ENCOUNTER — OFFICE VISIT (OUTPATIENT)
Age: 74
End: 2024-01-08

## 2024-01-08 VITALS
WEIGHT: 204 LBS | HEART RATE: 72 BPM | DIASTOLIC BLOOD PRESSURE: 80 MMHG | TEMPERATURE: 98.9 F | OXYGEN SATURATION: 98 % | BODY MASS INDEX: 28.45 KG/M2 | RESPIRATION RATE: 18 BRPM | SYSTOLIC BLOOD PRESSURE: 129 MMHG

## 2024-01-08 DIAGNOSIS — J06.9 VIRAL URI WITH COUGH: ICD-10-CM

## 2024-01-08 DIAGNOSIS — J40 BRONCHITIS: Primary | ICD-10-CM

## 2024-01-08 LAB
INFLUENZA A ANTIGEN, POC: NEGATIVE
INFLUENZA B ANTIGEN, POC: NEGATIVE
VALID INTERNAL CONTROL, POC: NORMAL

## 2024-01-08 RX ORDER — PREDNISONE 10 MG/1
TABLET ORAL
Qty: 21 EACH | Refills: 0 | Status: SHIPPED | OUTPATIENT
Start: 2024-01-08

## 2024-01-08 ASSESSMENT — ENCOUNTER SYMPTOMS: COUGH: 1

## 2024-01-08 NOTE — PROGRESS NOTES
Miguel Plummer (:  1950) is a 73 y.o. male,Established patient, here for evaluation of the following chief complaint(s):  Cough (C/o cough and congestion. Took covid test yesterday and was negative. Sx 2 days.)      ASSESSMENT/PLAN:  Visit Diagnoses and Associated Orders       Bronchitis    -  Primary    AMB POC INFLUENZA A  AND B REAL-TIME RT-PCR [18708 CPT(R)]      predniSONE 10 MG (21) TBPK [117976]           Viral URI with cough                        Per orders, fluids, rest      Follow up with PCP in PRN days if symptoms persist or if symptoms worsen.    SUBJECTIVE/OBJECTIVE:    Cough  HPI:   73 y.o. male presents with symptoms of Sinus Pain  Patient complains of clear rhinorrhea, congestion, cough, and occasional wheezing . Onset of symptoms was 2 days ago. Symptoms have been gradually worsening since that time.  Denies fevers, chills, myalgias, CP, SOB.   He is drinking plenty of fluids.  Past history is significant for occasional episodes of bronchitis. Patient is non-smoker.  Taking otc coricidin type of med with minimal relief.  States doesn't feel terrible.  Played pickleball yesterday and wants to play poker tonight.  Home covid test negative.           Vitals:    24 0914   BP: 129/80   Site: Left Upper Arm   Position: Sitting   Cuff Size: Medium Adult   Pulse: 72   Resp: 18   Temp: 98.9 °F (37.2 °C)   TempSrc: Temporal   SpO2: 98%   Weight: 92.5 kg (204 lb)         Physical Exam  Constitutional:       General: He is not in acute distress.     Appearance: Normal appearance. He is not ill-appearing or toxic-appearing.   HENT:      Head: Normocephalic and atraumatic.      Right Ear: Tympanic membrane, ear canal and external ear normal.      Left Ear: Tympanic membrane, ear canal and external ear normal.      Nose: Congestion present.      Mouth/Throat:      Mouth: Mucous membranes are moist.      Pharynx: Oropharynx is clear.   Eyes:      Extraocular Movements: Extraocular movements

## 2024-07-03 ENCOUNTER — HOSPITAL ENCOUNTER (OUTPATIENT)
Facility: HOSPITAL | Age: 74
Discharge: HOME | End: 2024-07-06

## 2024-07-03 ENCOUNTER — CLINICAL DOCUMENTATION (OUTPATIENT)
Facility: HOSPITAL | Age: 74
End: 2024-07-03

## 2024-07-03 VITALS
HEIGHT: 71 IN | DIASTOLIC BLOOD PRESSURE: 77 MMHG | WEIGHT: 207 LBS | RESPIRATION RATE: 16 BRPM | BODY MASS INDEX: 28.98 KG/M2 | SYSTOLIC BLOOD PRESSURE: 122 MMHG | OXYGEN SATURATION: 94 % | HEART RATE: 60 BPM

## 2024-07-03 DIAGNOSIS — C61 MALIGNANT NEOPLASM OF PROSTATE (HCC): Primary | ICD-10-CM

## 2024-07-03 RX ORDER — COLCHICINE 0.6 MG/1
TABLET ORAL
COMMUNITY

## 2024-07-03 RX ORDER — ALLOPURINOL 100 MG/1
2 TABLET ORAL DAILY
COMMUNITY
Start: 2023-12-15

## 2024-07-03 RX ORDER — VALSARTAN 160 MG/1
TABLET ORAL
COMMUNITY
Start: 2024-06-19

## 2024-07-03 ASSESSMENT — PAIN SCALES - GENERAL: PAINLEVEL_OUTOF10: 0

## 2024-07-03 NOTE — PROGRESS NOTES
[facility]  [facility]      RADIATION ONCOLOGY INITIAL CONSULTATION NOTE      Encounter Date: 07/03/24  Patient Name: Miguel Plummer  YOB: 1950  Medical Record Number: [id number]  Referring Physician: Chavo Hayes III, MD  [address] [place] [address]  Primary Care Provider: David Nevarez MD      DIAGNOSIS:       ICD-10-CM    1. Malignant neoplasm of prostate (HCC)  C61         STAGING:    Cancer Staging   Malignant neoplasm of prostate (HCC)  Staging form: Prostate, AJCC 8th Edition  - Clinical stage from 5/23/2024: Stage IIC (cT1c, cN0, cM0, PSA: 8.9, Grade Group: 3) - Signed by David Morales MD on 7/3/2024  AJCC Staging has been reviewed      CHIEF COMPLAINT:   Prostate adenocarcinoma      ASSESSMENT:   72 yo M with unfavorable intermediate risk prostate cancer, cT1c, iPSA 8.93, [place] 4+3=7 in 1 core (5/13 total cores involved).      The patient has unfavorable intermediate-risk prostate cancer given his Darrian 4+3=7 disease.  I reviewed the natural history of unfavorable intermediate-risk prostate cancer and the therapeutic options available including radiation therapy and surgery. Based on his risk stratification, I would not recommend active surveillance. The patient has also had a discussion regarding treatment with Dr. Hayes (Urology). Both surgery and radiation are first line treatment options and offer similar long-term cure rates.      Also discussed the [facility] nomogram which showed his risk for organ confined disease is 36%, EPE is 61%, SVI 12%, and victoria involvement 14%.  Pelvic MRI showed a PIRADS-3 lesion in the right PZ at mid gland.  He has not had any additional imaging.  I would recommend a PSMA PET/CT for additional staging.    In terms of radiation, the patient is a candidate for either intensity modulated radiation therapy (IMRT), stereotactic body radiation therapy (SBRT), or brachytherapy,

## 2024-07-05 NOTE — PROGRESS NOTES
N[facility]N Distress Thermometer    Date Screening Completed: 7/3/24    Screening Declined:  [] Yes    Number that best describes how much distress you've experienced in the past week, including today?  0 [] - No distress 1 []      2 [x]      3 []      4 []       5 []       6 []      7 []      8 []      9 []       10 [] - Extreme distress    PROBLEM LIST  Have you had concerns about any of the items below in the past week, including today?      Physical Concerns Practical Concerns   [] Pain [] Taking care of myself    [] Sleep [] Taking care of others    [] Fatigue [] Work   [] Tobacco use  [] School   [] Substance use  [] Housing   [] Memory or concentration [] Finances   [] Sexual health [] Insurance   [] Changes in eating  [] Transportation   [] Loss or change of physical abilities  []     [] Having enough food   Emotional Concerns [] Access to medicine   [] Worry or anxiety [] Treatment decisions   [] Sadness or depression    [] Loss of interest or enjoyment  Spiritual or Gnosticism Concerns   [] Grief or loss  [] Sense of meaning or purpose   [] Fear [] Changes in brittany or beliefs   [] Loneliness  [] Death, dying, or afterlife   [] Anger [] Conflict between beliefs and cancer treatments    [] Changes in appearance [] Relationship with the sacred   [] Feelings of worthlessness or being a burden [] Ritual or dietary needs        Social Concerns     [] Relationship with spouse or partner     [] Relationship with children    [] Relationship with family members     [] Relationship with friends or coworkers     [] Communication with health care team     [] Ability to have children     [] Prejudice or discrimination        Other Concerns:     Patient received resource information and education:  [] Yes  [x] No

## 2024-07-18 ENCOUNTER — TELEPHONE (OUTPATIENT)
Facility: HOSPITAL | Age: 74
End: 2024-07-18

## 2024-07-18 NOTE — TELEPHONE ENCOUNTER
Called patient to go over results of recent PSMA PET scan which was done 7/15/2024.  PET scan shows multifocal uptake within the prostate at 2 different locations; however, no uptake outside the prostate.  No uptake in SV, lymph nodes, bones, or distant disease.    He is going to continue to think about the treatment options and will let urology or myself know of a decision in the near future.

## 2024-08-15 ENCOUNTER — TELEPHONE (OUTPATIENT)
Facility: HOSPITAL | Age: 74
End: 2024-08-15

## 2024-08-15 NOTE — TELEPHONE ENCOUNTER
Patient called and would like to proceed with definitive SBRT x 5 fractions + 4 months ADT for his prostate cancer treatment.    Will coordinate spaceOAR/fiducials with urology along with ADT start date and updated PSA/baseline testosterone.    Then will arrange noncontrasted CT simulation for SBRT planning ~7-10 days after spaceOAR placement.

## 2024-09-05 DIAGNOSIS — C61 MALIGNANT NEOPLASM OF PROSTATE (HCC): Primary | ICD-10-CM

## (undated) DEVICE — SST TWIST DRILL, STANDARD, 2MM DIA. X 127MM: Brand: MICROAIRE®

## (undated) DEVICE — HEWSON SUTURE RETRIEVER: Brand: HEWSON SUTURE RETRIEVER

## (undated) DEVICE — SOLUTION IRRIG 1000ML STRL H2O USP PLAS POUR BTL

## (undated) DEVICE — STERILE SURGICAL BLADE SIZE 15: Brand: CARDINAL HEALTH

## (undated) DEVICE — SUTURE FIBERWIRE SZ 5 L38IN NONABSORBABLE BLU L48MM 1/2 AR7211

## (undated) DEVICE — TOTAL JOINT - SMH: Brand: MEDLINE INDUSTRIES, INC.

## (undated) DEVICE — COVER,MAYO STAND,STERILE: Brand: MEDLINE

## (undated) DEVICE — BRUSH SCRB DRY NL CLN LF GRN --

## (undated) DEVICE — GLOVE ORTHO 8   MSG9480

## (undated) DEVICE — SURGIFOAM SPNG SZ 12-7

## (undated) DEVICE — GLOVE SURG SZ 65 L12IN FNGR THK79MIL GRN LTX FREE

## (undated) DEVICE — TUBING SUCT 12FR MAL ALUM SHFT FN CAP VENT UNIV CONN W/ OBT

## (undated) DEVICE — BLADE SAW W073XL276IN THK0031IN CUT THK0036IN REPL SAG

## (undated) DEVICE — DRAPE,REIN 53X77,STERILE: Brand: MEDLINE

## (undated) DEVICE — PIN STEINMANN RVRS STL THRD TP --

## (undated) DEVICE — SOLUTION SURG PREP 26 CC PURPREP

## (undated) DEVICE — DRAPE,U/ SHT,SPLIT,PLAS,STERIL: Brand: MEDLINE

## (undated) DEVICE — SYRINGE MED 10ML LUERLOCK TIP W/O SFTY DISP

## (undated) DEVICE — HANDPIECE SET WITH BONE CLEANING TIP AND SUCTION TUBE: Brand: INTERPULSE

## (undated) DEVICE — SUTURE VCRL SZ 2-0 L36IN ABSRB UD L36MM CT-1 1/2 CIR J945H

## (undated) DEVICE — DRESSING HYDROCOLLOID BORDER 35X10 IN ALUM PRIMASEAL

## (undated) DEVICE — SUT MCRYL 3-0 18IN PS2 UD --

## (undated) DEVICE — SUTURE FIBERWIRE SZ 2 L38IN NONABSORBABLE BLU WHT BLK AR7201

## (undated) DEVICE — YANKAUER,FLEXIBLE HANDLE,REGLR CAPACITY: Brand: MEDLINE INDUSTRIES, INC.

## (undated) DEVICE — GOWN,PREVENTION PLUS,XLN/2XL,ST,22/CS: Brand: MEDLINE

## (undated) DEVICE — SUT FIBRWIR 2 38IN BLU --

## (undated) DEVICE — GARMENT,MEDLINE,DVT,INT,CALF,MED, GEN2: Brand: MEDLINE

## (undated) DEVICE — MARKER,SKIN,WI/RULER AND LABELS: Brand: MEDLINE

## (undated) DEVICE — SLING ARM LIFETEC ORTH UNIV --

## (undated) DEVICE — 4-PORT MANIFOLD: Brand: NEPTUNE 2

## (undated) DEVICE — STRIP,CLOSURE,WOUND,MEDI-STRIP,1/2X4: Brand: MEDLINE

## (undated) DEVICE — SPONGE GZ W4XL4IN COT 12 PLY TYP VII WVN C FLD DSGN STERILE

## (undated) DEVICE — GLOVE SURG SZ 8 L12IN FNGR THK94MIL STD WHT LTX FREE